# Patient Record
Sex: MALE | Race: OTHER | NOT HISPANIC OR LATINO | URBAN - METROPOLITAN AREA
[De-identification: names, ages, dates, MRNs, and addresses within clinical notes are randomized per-mention and may not be internally consistent; named-entity substitution may affect disease eponyms.]

---

## 2019-12-15 ENCOUNTER — EMERGENCY (EMERGENCY)
Facility: HOSPITAL | Age: 34
LOS: 1 days | Discharge: HOME | End: 2019-12-15
Attending: EMERGENCY MEDICINE | Admitting: STUDENT IN AN ORGANIZED HEALTH CARE EDUCATION/TRAINING PROGRAM
Payer: MEDICAID

## 2019-12-15 VITALS
RESPIRATION RATE: 18 BRPM | DIASTOLIC BLOOD PRESSURE: 90 MMHG | HEART RATE: 98 BPM | TEMPERATURE: 98 F | OXYGEN SATURATION: 99 % | SYSTOLIC BLOOD PRESSURE: 138 MMHG

## 2019-12-15 LAB
ALBUMIN SERPL ELPH-MCNC: 5.1 G/DL — HIGH (ref 3.3–5)
ALP SERPL-CCNC: 84 U/L — SIGNIFICANT CHANGE UP (ref 40–120)
ALT FLD-CCNC: 22 U/L — SIGNIFICANT CHANGE UP (ref 4–41)
AMPHET UR-MCNC: NEGATIVE — SIGNIFICANT CHANGE UP
ANION GAP SERPL CALC-SCNC: 30 MMO/L — HIGH (ref 7–14)
APAP SERPL-MCNC: < 15 UG/ML — LOW (ref 15–25)
APPEARANCE UR: CLEAR — SIGNIFICANT CHANGE UP
AST SERPL-CCNC: 31 U/L — SIGNIFICANT CHANGE UP (ref 4–40)
BACTERIA # UR AUTO: NEGATIVE — SIGNIFICANT CHANGE UP
BARBITURATES UR SCN-MCNC: NEGATIVE — SIGNIFICANT CHANGE UP
BASE EXCESS BLDV CALC-SCNC: -13.1 MMOL/L — SIGNIFICANT CHANGE UP
BASE EXCESS BLDV CALC-SCNC: -3.5 MMOL/L — SIGNIFICANT CHANGE UP
BASOPHILS # BLD AUTO: 0.07 K/UL — SIGNIFICANT CHANGE UP (ref 0–0.2)
BASOPHILS NFR BLD AUTO: 0.4 % — SIGNIFICANT CHANGE UP (ref 0–2)
BENZODIAZ UR-MCNC: POSITIVE — SIGNIFICANT CHANGE UP
BILIRUB SERPL-MCNC: 0.7 MG/DL — SIGNIFICANT CHANGE UP (ref 0.2–1.2)
BILIRUB UR-MCNC: NEGATIVE — SIGNIFICANT CHANGE UP
BLOOD GAS VENOUS - CREATININE: 0.82 MG/DL — SIGNIFICANT CHANGE UP (ref 0.5–1.3)
BLOOD GAS VENOUS - CREATININE: SIGNIFICANT CHANGE UP MG/DL (ref 0.5–1.3)
BLOOD GAS VENOUS - FIO2: 21 — SIGNIFICANT CHANGE UP
BLOOD GAS VENOUS - FIO2: 21 — SIGNIFICANT CHANGE UP
BLOOD UR QL VISUAL: NEGATIVE — SIGNIFICANT CHANGE UP
BUN SERPL-MCNC: 13 MG/DL — SIGNIFICANT CHANGE UP (ref 7–23)
CA-I BLD-SCNC: 1.19 MMOL/L — SIGNIFICANT CHANGE UP (ref 1.03–1.23)
CALCIUM SERPL-MCNC: 10.5 MG/DL — SIGNIFICANT CHANGE UP (ref 8.4–10.5)
CANNABINOIDS UR-MCNC: POSITIVE — SIGNIFICANT CHANGE UP
CHLORIDE BLDV-SCNC: 105 MMOL/L — SIGNIFICANT CHANGE UP (ref 96–108)
CHLORIDE BLDV-SCNC: 117 MMOL/L — HIGH (ref 96–108)
CHLORIDE SERPL-SCNC: 102 MMOL/L — SIGNIFICANT CHANGE UP (ref 98–107)
CK SERPL-CCNC: 410 U/L — HIGH (ref 30–200)
CO2 SERPL-SCNC: 8 MMOL/L — CRITICAL LOW (ref 22–31)
COCAINE METAB.OTHER UR-MCNC: NEGATIVE — SIGNIFICANT CHANGE UP
COLOR SPEC: SIGNIFICANT CHANGE UP
CREAT SERPL-MCNC: 1.03 MG/DL — SIGNIFICANT CHANGE UP (ref 0.5–1.3)
EOSINOPHIL # BLD AUTO: 0.02 K/UL — SIGNIFICANT CHANGE UP (ref 0–0.5)
EOSINOPHIL NFR BLD AUTO: 0.1 % — SIGNIFICANT CHANGE UP (ref 0–6)
ETHANOL BLD-MCNC: < 10 MG/DL — SIGNIFICANT CHANGE UP
GAS PNL BLDV: 136 MMOL/L — SIGNIFICANT CHANGE UP (ref 136–146)
GAS PNL BLDV: 142 MMOL/L — SIGNIFICANT CHANGE UP (ref 136–146)
GLUCOSE BLDV-MCNC: 112 MG/DL — HIGH (ref 70–99)
GLUCOSE BLDV-MCNC: 183 MG/DL — HIGH (ref 70–99)
GLUCOSE SERPL-MCNC: 175 MG/DL — HIGH (ref 70–99)
GLUCOSE UR-MCNC: NEGATIVE — SIGNIFICANT CHANGE UP
HCO3 BLDV-SCNC: 15 MMOL/L — LOW (ref 20–27)
HCO3 BLDV-SCNC: 22 MMOL/L — SIGNIFICANT CHANGE UP (ref 20–27)
HCT VFR BLD CALC: 52.7 % — HIGH (ref 39–50)
HCT VFR BLDV CALC: 43.8 % — SIGNIFICANT CHANGE UP (ref 39–51)
HCT VFR BLDV CALC: 51.2 % — HIGH (ref 39–51)
HGB BLD-MCNC: 16.7 G/DL — SIGNIFICANT CHANGE UP (ref 13–17)
HGB BLDV-MCNC: 14.3 G/DL — SIGNIFICANT CHANGE UP (ref 13–17)
HGB BLDV-MCNC: 16.7 G/DL — SIGNIFICANT CHANGE UP (ref 13–17)
HYALINE CASTS # UR AUTO: SIGNIFICANT CHANGE UP
IMM GRANULOCYTES NFR BLD AUTO: 0.4 % — SIGNIFICANT CHANGE UP (ref 0–1.5)
KETONES UR-MCNC: SIGNIFICANT CHANGE UP
LACTATE BLDV-MCNC: 16 MMOL/L — CRITICAL HIGH (ref 0.5–2)
LACTATE BLDV-MCNC: 2 MMOL/L — SIGNIFICANT CHANGE UP (ref 0.5–2)
LEUKOCYTE ESTERASE UR-ACNC: NEGATIVE — SIGNIFICANT CHANGE UP
LYMPHOCYTES # BLD AUTO: 10.8 % — LOW (ref 13–44)
LYMPHOCYTES # BLD AUTO: 2.04 K/UL — SIGNIFICANT CHANGE UP (ref 1–3.3)
MCHC RBC-ENTMCNC: 30.9 PG — SIGNIFICANT CHANGE UP (ref 27–34)
MCHC RBC-ENTMCNC: 31.7 % — LOW (ref 32–36)
MCV RBC AUTO: 97.6 FL — SIGNIFICANT CHANGE UP (ref 80–100)
METHADONE UR-MCNC: NEGATIVE — SIGNIFICANT CHANGE UP
MONOCYTES # BLD AUTO: 1.11 K/UL — HIGH (ref 0–0.9)
MONOCYTES NFR BLD AUTO: 5.9 % — SIGNIFICANT CHANGE UP (ref 2–14)
NEUTROPHILS # BLD AUTO: 15.52 K/UL — HIGH (ref 1.8–7.4)
NEUTROPHILS NFR BLD AUTO: 82.4 % — HIGH (ref 43–77)
NITRITE UR-MCNC: NEGATIVE — SIGNIFICANT CHANGE UP
NRBC # FLD: 0 K/UL — SIGNIFICANT CHANGE UP (ref 0–0)
OPIATES UR-MCNC: NEGATIVE — SIGNIFICANT CHANGE UP
OXYCODONE UR-MCNC: NEGATIVE — SIGNIFICANT CHANGE UP
PCO2 BLDV: 27 MMHG — LOW (ref 41–51)
PCO2 BLDV: 37 MMHG — LOW (ref 41–51)
PCP UR-MCNC: NEGATIVE — SIGNIFICANT CHANGE UP
PH BLDV: 7.28 PH — LOW (ref 7.32–7.43)
PH BLDV: 7.37 PH — SIGNIFICANT CHANGE UP (ref 7.32–7.43)
PH UR: 6.5 — SIGNIFICANT CHANGE UP (ref 5–8)
PLATELET # BLD AUTO: 339 K/UL — SIGNIFICANT CHANGE UP (ref 150–400)
PMV BLD: 9.7 FL — SIGNIFICANT CHANGE UP (ref 7–13)
PO2 BLDV: 173 MMHG — HIGH (ref 35–40)
PO2 BLDV: 71 MMHG — HIGH (ref 35–40)
POTASSIUM BLDV-SCNC: 4.1 MMOL/L — SIGNIFICANT CHANGE UP (ref 3.4–4.5)
POTASSIUM BLDV-SCNC: 4.4 MMOL/L — SIGNIFICANT CHANGE UP (ref 3.4–4.5)
POTASSIUM SERPL-MCNC: 4.6 MMOL/L — SIGNIFICANT CHANGE UP (ref 3.5–5.3)
POTASSIUM SERPL-SCNC: 4.6 MMOL/L — SIGNIFICANT CHANGE UP (ref 3.5–5.3)
PROLACTIN SERPL-MCNC: 30.3 NG/ML — HIGH (ref 4.1–18.4)
PROT SERPL-MCNC: 9 G/DL — HIGH (ref 6–8.3)
PROT UR-MCNC: 20 — SIGNIFICANT CHANGE UP
RBC # BLD: 5.4 M/UL — SIGNIFICANT CHANGE UP (ref 4.2–5.8)
RBC # FLD: 12.7 % — SIGNIFICANT CHANGE UP (ref 10.3–14.5)
RBC CASTS # UR COMP ASSIST: SIGNIFICANT CHANGE UP (ref 0–?)
SALICYLATES SERPL-MCNC: < 5 MG/DL — LOW (ref 15–30)
SAO2 % BLDV: 90.5 % — HIGH (ref 60–85)
SAO2 % BLDV: 99.1 % — HIGH (ref 60–85)
SODIUM SERPL-SCNC: 140 MMOL/L — SIGNIFICANT CHANGE UP (ref 135–145)
SP GR SPEC: 1.02 — SIGNIFICANT CHANGE UP (ref 1–1.04)
SQUAMOUS # UR AUTO: SIGNIFICANT CHANGE UP
UROBILINOGEN FLD QL: NORMAL — SIGNIFICANT CHANGE UP
WBC # BLD: 18.83 K/UL — HIGH (ref 3.8–10.5)
WBC # FLD AUTO: 18.83 K/UL — HIGH (ref 3.8–10.5)
WBC UR QL: SIGNIFICANT CHANGE UP (ref 0–?)

## 2019-12-15 PROCEDURE — 70450 CT HEAD/BRAIN W/O DYE: CPT | Mod: 26

## 2019-12-15 PROCEDURE — 99219: CPT

## 2019-12-15 PROCEDURE — 70553 MRI BRAIN STEM W/O & W/DYE: CPT | Mod: 26

## 2019-12-15 PROCEDURE — 71045 X-RAY EXAM CHEST 1 VIEW: CPT | Mod: 26

## 2019-12-15 RX ORDER — SODIUM CHLORIDE 9 MG/ML
2000 INJECTION INTRAMUSCULAR; INTRAVENOUS; SUBCUTANEOUS ONCE
Refills: 0 | Status: COMPLETED | OUTPATIENT
Start: 2019-12-15 | End: 2019-12-15

## 2019-12-15 RX ADMIN — SODIUM CHLORIDE 2000 MILLILITER(S): 9 INJECTION INTRAMUSCULAR; INTRAVENOUS; SUBCUTANEOUS at 11:33

## 2019-12-15 RX ADMIN — Medication 1 MILLIGRAM(S): at 21:10

## 2019-12-15 RX ADMIN — Medication 1 MILLIGRAM(S): at 11:21

## 2019-12-15 RX ADMIN — Medication 1 MILLIGRAM(S): at 11:22

## 2019-12-15 NOTE — ED CDU PROVIDER INITIAL DAY NOTE - PROGRESS NOTE DETAILS
cdu minerva su: pt examined at bedside, resting comfortably, denies any chest pain, sob, abdominal pain, awaiting mri results and spot EEG, will continue to reasses

## 2019-12-15 NOTE — ED ADULT NURSE NOTE - CHIEF COMPLAINT QUOTE
Pt states that he has a hx of "frozen" right shoulder, is supposed to have surgery and stopped taking all of his pain medications, now complaining of pain.  Of note, pt with scalp injury, states that he had a seizure on Friday and was seen and discharged from [place].  Past medical history: right shoulder pain, seizure disorder not on any meds per pt

## 2019-12-15 NOTE — ED PROVIDER NOTE - PROGRESS NOTE DETAILS
Dr. Gaspar: On evaluation father felt concern pt was having a seizure with no history of seizures but was seen shaking. Pt was awake and reported having b/l shoulder pain and breathing quickly. Pt admitted to feeling anxious and uncomfortable secondary to the pain. Dr. Gaspar: Pt states feeling better. Symptoms resolving. Discussed with Neuro who recommended spot EEG and MRI. Discussed with pt for OBS which pt agreed to.

## 2019-12-15 NOTE — ED PROVIDER NOTE - PHYSICAL EXAMINATION
neuro: Patient with right wrist contracted in flexed position with right arm contracted and stiff and straight, A&Ox3, following commands with left side but difficutl with right side, no facial droop, normal EOMs, no facial droop, response to Pain 9IV insertion) appropriate. Right side of patient body with myoclonus like moving but not gross clonic-tonic movements.

## 2019-12-15 NOTE — ED CDU PROVIDER INITIAL DAY NOTE - PHYSICAL EXAMINATION
abrasion to R side scalp and R hand   no bony tenderness, FROM of wrist and hands, sensation and strength normal  pain with shoulder shrug b/l

## 2019-12-15 NOTE — ED ADULT NURSE REASSESSMENT NOTE - NS ED NURSE REASSESS COMMENT FT1
pt awake, calm, oriented x 3. pt reports feeling much improvement. pt reports pain to right shoulder is gone. pt denies headache, chest pain, sob, n/v/d, abdominal pain. repeat VBG sent

## 2019-12-15 NOTE — ED PROVIDER NOTE - CROS ED NEURO ALL NEG
Attending Attestation (For Attendings USE Only)... Attending Attestation (For Attendings USE Only).../Scribe Attestation (For Scribes USE Only)... negative...

## 2019-12-15 NOTE — CONSULT NOTE ADULT - ASSESSMENT
Impression: R sided focal seizure (possible L sided focus from prior head trauma w/ surgery?) vs myoclonus    Recommend:  - complete infectious workup, serum tox and urine tox   - please send TSH, B12, MMA, folate   - routine EEG  - MRI brain with and without to evaluate for possible underlying structural lesion 34Y M with PMH MS s/p stem cell transplant several years ago (in [place]), currently does not follow w/ Neuro or take any medications for MS. He also has hx of b/l chronic shoulder pain due to torn rotator cuff muscles in each shoulder. He presents with 2 episodes of R sided stiffening that started on 12/13. Exam non-focal but pt lethargic due to receiving Ativan. CT Head negative.    Impression: R sided stiffening can possibly be a R sided focal seizure (possible L sided focus from prior head trauma w/ surgery that patient mentioned though CT Head negative) vs RUE myoclonus vs severe panic attack     Recommend:  - complete infectious workup, serum tox and urine tox   - routine EEG  - MRI brain with and without to evaluate for possible underlying structural lesion 34Y M with PMH MS s/p stem cell transplant several years ago (in [place]), currently does not follow w/ Neuro or take any medications for MS. He also has hx of b/l chronic shoulder pain due to torn rotator cuff muscles in each shoulder. He presents with 2 episodes of R sided stiffening that started on 12/13. Exam non-focal but pt lethargic due to receiving Ativan. CT Head negative.    Impression: R sided stiffening can possibly be a R sided focal seizure (possible L sided focus from prior head trauma w/ surgery that patient mentioned though CT Head negative) vs RUE myoclonus vs possible MS flare (although less likely given presence of positive symptoms as opposed to negative symptoms) vs severe panic attack     Recommend:  - complete infectious workup, serum tox and urine tox   - routine EEG  - MRI brain and Cspine with and without to evaluate for possible underlying structural lesion

## 2019-12-15 NOTE — ED ADULT TRIAGE NOTE - CHIEF COMPLAINT QUOTE
Pt states that he has a hx of "frozen" right shoulder, is supposed to have surgery and stopped taking all of his pain medications, now complaining of pain.  Of note, pt with scalp injury, states that he had a seizure on Friday and was seen and discharged from [place].  Past medical history: right shoulder pain, seizure disorder not on any meds per pt

## 2019-12-15 NOTE — CONSULT NOTE ADULT - SUBJECTIVE AND OBJECTIVE BOX
HPI:          MEDICATIONS  (STANDING):    MEDICATIONS  (PRN):    PAST MEDICAL & SURGICAL HISTORY:  Frozen shoulder syndrome  Multiple sclerosis  No significant past surgical history    FAMILY HISTORY:  No pertinent family history in first degree relatives    Allergies    No Known Allergies    Intolerances        SHx - No smoking, No ETOH, No drug abuse    Review of Systems:  A 10 system ROS was performed and is negative except for those mentioned in HPI      Vital Signs Last 24 Hrs  T(C): 36.4 (15 Dec 2019 10:26), Max: 36.4 (15 Dec 2019 10:26)  T(F): 97.5 (15 Dec 2019 10:26), Max: 97.5 (15 Dec 2019 10:26)  HR: 98 (15 Dec 2019 10:26) (98 - 98)  BP: 138/90 (15 Dec 2019 10:26) (138/90 - 138/90)  BP(mean): --  RR: 18 (15 Dec 2019 10:26) (18 - 18)  SpO2: 99% (15 Dec 2019 10:26) (99% - 99%)    Neurological (>12):  MS: Eyes closed but opens to verbal stimuli (just recieved Ativan), alert, oriented to person, place, situation, time. Normal affect. Follows all commands.    Language: Speech is hypophonic but clear, fluent with good repetition & comprehension     CNs: PERRLA (R = 3mm, L = 3mm). VFF. EOMI no nystagmus, no diplopia. V1-3 intact to LT/pinprick, well developed masseter muscles b/l. No facial asymmetry b/l, full eye closure strength b/l. Hearing grossly normal (rubbing fingers) b/l. Symmetric palate elevation in midline. Gag reflex deferred. Head turning & shoulder shrug intact b/l. Tongue midline, normal movements, no atrophy.      Motor: Rigid in bilateral UE, normal muscle bulk. No noticeable tremor or seizure. No pronator drift. Exam limited by bilateral shoulder pain              Deltoid	Biceps	Triceps	Wrist	Finger ABd	   R	5	5	5	5	5		5 	  L	5	5	5	5	5		5    	H-Flex	H-Ext	H-ABd	H-ADd	K-Flex	K-Ext	D-Flex	P-Flex  R	5	5	5	5	5	5	5	5 	   L	5	5	5	5	5	5	5	5	     Sensation: Intact to LT/PP/Temp/Vibration/Position b/l throughout.     Cortical: Extinction on DSS (neglect): none    Reflexes:              Biceps(C5)       BR(C6)     Triceps(C7)               Patellar(L4)    Achilles(S1)    Plantar Resp  R	2	          2	             2		        3		    2		Down   L	2	          2	             2		        2		    2		Down     Coordination: intact rapid-alt movements. No dysmetria to FTN/HTS    Gait: deferred      12-15    140  |  102  |  13  ----------------------------<  175<H>  4.6   |  8<LL>  |  1.03    Ca    10.5      15 Dec 2019 11:01    TPro  9.0<H>  /  Alb  5.1<H>  /  TBili  0.7  /  DBili  x   /  AST  31  /  ALT  22  /  AlkPhos  84  12-15    12-15    140  |  102  |  13  ----------------------------<  175<H>  4.6   |  8<LL>  |  1.03    Ca    10.5      15 Dec 2019 11:01    TPro  9.0<H>  /  Alb  5.1<H>  /  TBili  0.7  /  DBili  x   /  AST  31  /  ALT  22  /  AlkPhos  84  12-15                          16.7   18.83 )-----------( 339      ( 15 Dec 2019 11:01 )             52.7       Radiology HPI:  Patient is a 34Y M with PMH MS s/p stem cell transplant several years ago (in [place]), currently does not follow w/ Neuro or take any medications for MS. He also has hx of b/l chronic shoulder pain due to torn rotator cuff muscles in each shoulder. He presents with 2 episodes of R sided stiffening        MEDICATIONS  (STANDING):    MEDICATIONS  (PRN):    PAST MEDICAL & SURGICAL HISTORY:  Frozen shoulder syndrome  Multiple sclerosis  No significant past surgical history    FAMILY HISTORY:  No pertinent family history in first degree relatives    Allergies    No Known Allergies    Intolerances        SHx - No smoking, No ETOH, No drug abuse    Review of Systems:  A 10 system ROS was performed and is negative except for those mentioned in HPI      Vital Signs Last 24 Hrs  T(C): 36.4 (15 Dec 2019 10:26), Max: 36.4 (15 Dec 2019 10:26)  T(F): 97.5 (15 Dec 2019 10:26), Max: 97.5 (15 Dec 2019 10:26)  HR: 98 (15 Dec 2019 10:26) (98 - 98)  BP: 138/90 (15 Dec 2019 10:26) (138/90 - 138/90)  BP(mean): --  RR: 18 (15 Dec 2019 10:26) (18 - 18)  SpO2: 99% (15 Dec 2019 10:26) (99% - 99%)    Neurological (>12):  MS: Eyes closed but opens to verbal stimuli (just recieved Ativan), alert, oriented to person, place, situation, time. Normal affect. Follows all commands.    Language: Speech is hypophonic but clear, fluent with good repetition & comprehension     CNs: PERRLA (R = 3mm, L = 3mm). VFF. EOMI no nystagmus, no diplopia. V1-3 intact to LT/pinprick, well developed masseter muscles b/l. No facial asymmetry b/l, full eye closure strength b/l. Hearing grossly normal (rubbing fingers) b/l. Symmetric palate elevation in midline. Gag reflex deferred. Head turning & shoulder shrug intact b/l. Tongue midline, normal movements, no atrophy.      Motor: Rigid in bilateral UE, normal muscle bulk. No noticeable tremor or seizure. No pronator drift. Exam limited by bilateral shoulder pain              Deltoid	Biceps	Triceps	Wrist	Finger ABd	   R	5	5	5	5	5		5 	  L	5	5	5	5	5		5    	H-Flex	H-Ext	H-ABd	H-ADd	K-Flex	K-Ext	D-Flex	P-Flex  R	5	5	5	5	5	5	5	5 	   L	5	5	5	5	5	5	5	5	     Sensation: Intact to LT/PP/Temp/Vibration/Position b/l throughout.     Cortical: Extinction on DSS (neglect): none    Reflexes:              Biceps(C5)       BR(C6)     Triceps(C7)               Patellar(L4)    Achilles(S1)    Plantar Resp  R	2	          2	             2		        3		    2		Down   L	2	          2	             2		        2		    2		Down     Coordination: intact rapid-alt movements. No dysmetria to FTN/HTS    Gait: deferred      12-15    140  |  102  |  13  ----------------------------<  175<H>  4.6   |  8<LL>  |  1.03    Ca    10.5      15 Dec 2019 11:01    TPro  9.0<H>  /  Alb  5.1<H>  /  TBili  0.7  /  DBili  x   /  AST  31  /  ALT  22  /  AlkPhos  84  12-15    12-15    140  |  102  |  13  ----------------------------<  175<H>  4.6   |  8<LL>  |  1.03    Ca    10.5      15 Dec 2019 11:01    TPro  9.0<H>  /  Alb  5.1<H>  /  TBili  0.7  /  DBili  x   /  AST  31  /  ALT  22  /  AlkPhos  84  12-15                          16.7   18.83 )-----------( 339      ( 15 Dec 2019 11:01 )             52.7       Radiology HPI:  Patient is a 34Y M with PMH MS s/p stem cell transplant several years ago (in [place]), currently does not follow w/ Neuro or take any medications for MS. He also has hx of b/l chronic shoulder pain due to torn rotator cuff muscles in each shoulder. He presents with 2 episodes of R sided stiffening that started on 12/13. He felt his R side stiffening and arvin and fell down and hit his head w/ abrasion on R frontoparietal region. He then presented to [place] ER where he did not have any imaging but was treated with haldol and versed for agitation. He had another episode of R sided stiffening this morning associated with tachypnea and diaphoresis. He has never lost consciousness during these episodes and denies any urinary incontinence or tongue biting associated with these episodes. Has hx of anxiety and panic attacks but never experienced anything like this in the past. CT Head performed in the ED negative for acute intracranial pathology. Patient has received 2mg Ativan while in the ED for severe anxiety      MEDICATIONS  (STANDING):    MEDICATIONS  (PRN):    PAST MEDICAL & SURGICAL HISTORY:  Frozen shoulder syndrome  Multiple sclerosis  No significant past surgical history    FAMILY HISTORY:  No pertinent family history in first degree relatives    Allergies    No Known Allergies    Intolerances        SHx - No smoking, No ETOH, No drug abuse    Review of Systems:  A 10 system ROS was performed and is negative except for those mentioned in HPI      Vital Signs Last 24 Hrs  T(C): 36.4 (15 Dec 2019 10:26), Max: 36.4 (15 Dec 2019 10:26)  T(F): 97.5 (15 Dec 2019 10:26), Max: 97.5 (15 Dec 2019 10:26)  HR: 98 (15 Dec 2019 10:26) (98 - 98)  BP: 138/90 (15 Dec 2019 10:26) (138/90 - 138/90)  BP(mean): --  RR: 18 (15 Dec 2019 10:26) (18 - 18)  SpO2: 99% (15 Dec 2019 10:26) (99% - 99%)    Neurological (>12):  MS: Eyes closed but opens to verbal stimuli (just recieved Ativan), alert, oriented to person, place, situation, time. Normal affect. Follows all commands.    Language: Speech is hypophonic but clear, fluent with good repetition & comprehension     CNs: PERRLA (R = 3mm, L = 3mm). VFF. EOMI no nystagmus, no diplopia. V1-3 intact to LT/pinprick, well developed masseter muscles b/l. No facial asymmetry b/l, full eye closure strength b/l. Hearing grossly normal (rubbing fingers) b/l. Symmetric palate elevation in midline. Gag reflex deferred. Head turning & shoulder shrug intact b/l. Tongue midline, normal movements, no atrophy.      Motor: Rigid in bilateral UE, normal muscle bulk. No noticeable tremor or seizure. No pronator drift. Exam limited by bilateral shoulder pain              Deltoid	Biceps	Triceps	Wrist	Finger ABd	   R	5	5	5	5	5		5 	  L	5	5	5	5	5		5    	H-Flex	H-Ext	H-ABd	H-ADd	K-Flex	K-Ext	D-Flex	P-Flex  R	5	5	5	5	5	5	5	5 	   L	5	5	5	5	5	5	5	5	     Sensation: Intact to LT/PP/Temp/Vibration/Position b/l throughout.     Cortical: Extinction on DSS (neglect): none    Reflexes:              Biceps(C5)       BR(C6)     Triceps(C7)               Patellar(L4)    Achilles(S1)    Plantar Resp  R	2	          2	             2		        3		    2		Down   L	2	          2	             2		        2		    2		Down     Coordination: intact rapid-alt movements. No dysmetria to FTN/HTS    Gait: deferred      12-15    140  |  102  |  13  ----------------------------<  175<H>  4.6   |  8<LL>  |  1.03    Ca    10.5      15 Dec 2019 11:01    TPro  9.0<H>  /  Alb  5.1<H>  /  TBili  0.7  /  DBili  x   /  AST  31  /  ALT  22  /  AlkPhos  84  12-15    12-15    140  |  102  |  13  ----------------------------<  175<H>  4.6   |  8<LL>  |  1.03    Ca    10.5      15 Dec 2019 11:01    TPro  9.0<H>  /  Alb  5.1<H>  /  TBili  0.7  /  DBili  x   /  AST  31  /  ALT  22  /  AlkPhos  84  12-15                          16.7   18.83 )-----------( 339      ( 15 Dec 2019 11:01 )             52.7       Radiology HPI:  Patient is a 34Y M with PMH MS s/p stem cell transplant several years ago (in [place]), currently does not follow w/ Neuro or take any medications for MS. Has never had MS flare (further hx limited by pts mental status) He also has hx of b/l chronic shoulder pain due to torn rotator cuff muscles in each shoulder. He presents with 2 episodes of R sided stiffening that started on 12/13. He felt his R side stiffening and arvin and fell down and hit his head w/ abrasion on R frontoparietal region. He then presented to [place] ER where he did not have any imaging but was treated with haldol and versed for agitation. He had another episode of R sided stiffening this morning associated with tachypnea and diaphoresis. He has never lost consciousness during these episodes and denies any urinary incontinence or tongue biting associated with these episodes. Has hx of anxiety and panic attacks but never experienced anything like this in the past. CT Head performed in the ED negative for acute intracranial pathology. Patient has received 2mg Ativan while in the ED for severe anxiety      MEDICATIONS  (STANDING):    MEDICATIONS  (PRN):    PAST MEDICAL & SURGICAL HISTORY:  Frozen shoulder syndrome  Multiple sclerosis  No significant past surgical history    FAMILY HISTORY:  No pertinent family history in first degree relatives    Allergies    No Known Allergies    Intolerances        SHx - No smoking, No ETOH, No drug abuse    Review of Systems:  A 10 system ROS was performed and is negative except for those mentioned in HPI      Vital Signs Last 24 Hrs  T(C): 36.4 (15 Dec 2019 10:26), Max: 36.4 (15 Dec 2019 10:26)  T(F): 97.5 (15 Dec 2019 10:26), Max: 97.5 (15 Dec 2019 10:26)  HR: 98 (15 Dec 2019 10:26) (98 - 98)  BP: 138/90 (15 Dec 2019 10:26) (138/90 - 138/90)  BP(mean): --  RR: 18 (15 Dec 2019 10:26) (18 - 18)  SpO2: 99% (15 Dec 2019 10:26) (99% - 99%)    Neurological (>12):  MS: Eyes closed but opens to verbal stimuli (just recieved Ativan), alert, oriented to person, place, situation, time. Normal affect. Follows all commands.    Language: Speech is hypophonic but clear, fluent with good repetition & comprehension     CNs: PERRLA (R = 3mm, L = 3mm). VFF. EOMI no nystagmus, no diplopia. V1-3 intact to LT/pinprick, well developed masseter muscles b/l. No facial asymmetry b/l, full eye closure strength b/l. Hearing grossly normal (rubbing fingers) b/l. Symmetric palate elevation in midline. Gag reflex deferred. Head turning & shoulder shrug intact b/l. Tongue midline, normal movements, no atrophy.      Motor: Rigid in bilateral UE, normal muscle bulk. No noticeable tremor or seizure. No pronator drift. Exam limited by bilateral shoulder pain              Deltoid	Biceps	Triceps	Wrist	Finger ABd	   R	5	5	5	5	5		5 	  L	5	5	5	5	5		5    	H-Flex	H-Ext	H-ABd	H-ADd	K-Flex	K-Ext	D-Flex	P-Flex  R	5	5	5	5	5	5	5	5 	   L	5	5	5	5	5	5	5	5	     Sensation: Intact to LT/PP/Temp/Vibration/Position b/l throughout.     Cortical: Extinction on DSS (neglect): none    Reflexes:              Biceps(C5)       BR(C6)     Triceps(C7)               Patellar(L4)    Achilles(S1)    Plantar Resp  R	2	          2	             2		        3		    2		Down   L	2	          2	             2		        2		    2		Down     Coordination: intact rapid-alt movements. No dysmetria to FTN/HTS    Gait: deferred      12-15    140  |  102  |  13  ----------------------------<  175<H>  4.6   |  8<LL>  |  1.03    Ca    10.5      15 Dec 2019 11:01    TPro  9.0<H>  /  Alb  5.1<H>  /  TBili  0.7  /  DBili  x   /  AST  31  /  ALT  22  /  AlkPhos  84  12-15    12-15    140  |  102  |  13  ----------------------------<  175<H>  4.6   |  8<LL>  |  1.03    Ca    10.5      15 Dec 2019 11:01    TPro  9.0<H>  /  Alb  5.1<H>  /  TBili  0.7  /  DBili  x   /  AST  31  /  ALT  22  /  AlkPhos  84  12-15                          16.7   18.83 )-----------( 339      ( 15 Dec 2019 11:01 )             52.7       Radiology

## 2019-12-15 NOTE — ED PROVIDER NOTE - OBJECTIVE STATEMENT
Pt is a 33 y/o M w/ a PMHx of MS s/p Stem cell transplant and history of chronic b/l shoulder pain due to frozen shoulders who p/w panic like episodes. Pt states his breathing increases and then his right side tenses and contracts, the patient does not lose consciousness at any point, no post-ictal state and no urinary incontinence or bowel incontinence. Had similar episode at [place] on Friday and had no CTs and was only given versed and haldol and was groggy most of the day yesterday.

## 2019-12-15 NOTE — CONSULT NOTE ADULT - ATTENDING COMMENTS
I have personally seen, examined, and participated in the care of this patient on rounds 12/16/19 with the neurology team.  I have reviewed all pertinent clinical information, including history, physical examination, assessment and plan.   Hx as reported above.  I note in addition, in particular, or instead, as follows:    Pt reports that in 2012, when he was working n [place], he developed burning sensation in the eyes, fingertips, and feet.  Someone told him he had MS; he was evaluated by an "MS team" who took his blood and blood from donors to "shoot up [his] spine."  He reports he was told that he "should be good for up to 13 years."  Those symptoms have not recurred since.  No further details obtainable from Pt.      The "stiffening" episodes he is now reporting begin in the upper R torso, moving to the shoulder and distally in the RUE.  He demonstrates by elevating the upper R arm and in a fluid motion also the forearm so that the RUE is held extended 90 degrees at the shoulder with the elbow not fully extended, palm facing downward.  He spontaneously offers that he has panic attacks and wonders if the stiffening episodes might be panic attacks.      He reports he had gone to [place] after the first stiffening episode, then had what he says was a panic attack while in the ED, and was given sedative medication.   No further substantive information obtainable.      He works as an "analyst."      The only abnormality noted in the brain MRI report was:  "A few punctate nonspecific foci of increased T2 and FLAIR signal involve   the white matter, without associated enhancement. These are of uncertain   etiology and clinical significance. Consider serial imaging follow-up   over time for reevaluation if warranted."      On examination:    Alert, medium muscularity well-developed.  Normal comprehension, expression, prosody.  Use of language, and vocabulary, above average.  Multiple tattoos at multiple sites.       Reflex                           Right    Left   Comment    Scapulohumeral               0        0  Pectoralis                        0        0  Biceps                             2       1  Triceps                            2       2  Brachiorad                      tr?     tr?  Fing flex                          0       0  Fontana                    absent absent  Hip add                         2-      2-  Suprapatellar                 2       2  Infrapatellar                   3       3  Gastroc                         2       2  Plantar                   flexor    flexor    No tremor, abnormal involuntary movements, percussion myotonia.        DISCUSSION    The story of a Dx of MS and treatment with intraspinal injection is unusual.  For someone with an apparently above-average level of intelligence and command of English he gives unexpectedly few details about his treatment/medical evaluations in [place] and at [place].      I agree with the above assessment that a seizure disorder is a possibility.  Psychiatric diagnosis(es) also to be considered.  I agree with the recommended tests.

## 2019-12-15 NOTE — ED PROVIDER NOTE - CLINICAL SUMMARY MEDICAL DECISION MAKING FREE TEXT BOX
Pt is a 35 y/o M w/ a PMHx of MS s/p Stem cell transplant and history of chronic b/l shoulder pain due to frozen shoulders who p/w panic like episode vs Sx. lower suspicion for seizure given exam and lack of post-ictal period. Given recent fall without imaging will get CBC, CMP, VBG w/ lactate, Prolactin as labs drawn during episode, give IVFs, Ativan 2mg IVP given, CT head, consult Neuro and may need admission to r/o stroke vs further MS workup. Contraction likely due to alkalosis from severe tachypnea and panic patient placed on nonrebreather without oxygen to help with CO2 rebreathing to prevent further alkalosis.

## 2019-12-15 NOTE — ED CDU PROVIDER INITIAL DAY NOTE - ATTENDING CONTRIBUTION TO CARE
35 yo M pmh MS s/p stem cell tx, bilateral frozen shoulder 2/2 torn rotator cuffs with with episodic RUE stiffening without change in mental status of uncertain etiology. patient without symptoms at this time. plan to observe in CDU, MRI, EEG, neurology re-eval.

## 2019-12-15 NOTE — ED CDU PROVIDER INITIAL DAY NOTE - OBJECTIVE STATEMENT
34 y.o male pmhx of MS s/p stem cell transplant several years ago (in [place]), currently does not follow w/ Neuro or take any medications for MS. Also has hx of b/l chronic shoulder pain due to torn rotator cuff muscles in each shoulder.  Presented to the ED with episodes of R sided upper extremity stiffening that resolves. Pt states feels like he is panicking but not a panic attack. He is alert and oriented during the episodes. He had an episode the other night that caused him to fall down, was seen at another hospital given meds and discharged. While in the ED, pt found to have elevated wbc, lactate that cleared and had episode in the ED of RUE stiffening that lasted seconds and improved with ativan. Seen by Neuro who recommended MRI and EEG .Pt had MRI pending results, currently no complaints at this time. Denies headache, dizziness, chest pain, sob, cough, n/v/d abdominal pain, numbness, tingling, weakness.

## 2019-12-15 NOTE — ED PROVIDER NOTE - ATTENDING CONTRIBUTION TO CARE
Pt was seen and evaluated by me. Pt is a 35 y/o male with a PMHx of MS s/p Stem cell transplant and history of chronic b/l shoulder pain due to frozen shoulders, and Anxiety who presented to the ED for b/l shoulder pain. X wks. Pt states over the past few weeks having worsening b/l shoulder pain. Pt denies any falls or trauma to the area. Pt denies any fever, chills, nausea, vomiting, SOB, chest pain, or abd pain. Pt was seen 2 days ago at [place] where he was given versed and haldol and then discharged. On evaluation pt appeared anxious with tachypnea. Pt noted to have contracture of right wrist. Advised to take deep breaths which helped resolve some symptoms. Lungs CTA b/l. RRR. Abd soft, non-tender. No joint tenderness. + distal pulses. 5/5 b/l UE. No facial asymmetry.

## 2019-12-15 NOTE — ED PROVIDER NOTE - SEVERE SEPSIS ALERT DETAILS
Currently given exam and previous episodes with elevated lactate likely related to shaking and less likely related to an infectious etiology.

## 2019-12-16 VITALS
SYSTOLIC BLOOD PRESSURE: 138 MMHG | RESPIRATION RATE: 18 BRPM | OXYGEN SATURATION: 100 % | HEART RATE: 72 BPM | TEMPERATURE: 98 F | DIASTOLIC BLOOD PRESSURE: 89 MMHG

## 2019-12-16 LAB
ALBUMIN SERPL ELPH-MCNC: 4.1 G/DL — SIGNIFICANT CHANGE UP (ref 3.3–5)
ALP SERPL-CCNC: 60 U/L — SIGNIFICANT CHANGE UP (ref 40–120)
ALT FLD-CCNC: 17 U/L — SIGNIFICANT CHANGE UP (ref 4–41)
ANION GAP SERPL CALC-SCNC: 14 MMO/L — SIGNIFICANT CHANGE UP (ref 7–14)
AST SERPL-CCNC: 25 U/L — SIGNIFICANT CHANGE UP (ref 4–40)
BASE EXCESS BLDV CALC-SCNC: -1.1 MMOL/L — SIGNIFICANT CHANGE UP
BASOPHILS # BLD AUTO: 0.03 K/UL — SIGNIFICANT CHANGE UP (ref 0–0.2)
BASOPHILS NFR BLD AUTO: 0.4 % — SIGNIFICANT CHANGE UP (ref 0–2)
BILIRUB SERPL-MCNC: 0.6 MG/DL — SIGNIFICANT CHANGE UP (ref 0.2–1.2)
BLOOD GAS VENOUS - CREATININE: 0.82 MG/DL — SIGNIFICANT CHANGE UP (ref 0.5–1.3)
BLOOD GAS VENOUS - FIO2: 21 — SIGNIFICANT CHANGE UP
BUN SERPL-MCNC: 9 MG/DL — SIGNIFICANT CHANGE UP (ref 7–23)
CALCIUM SERPL-MCNC: 8.8 MG/DL — SIGNIFICANT CHANGE UP (ref 8.4–10.5)
CHLORIDE BLDV-SCNC: 105 MMOL/L — SIGNIFICANT CHANGE UP (ref 96–108)
CHLORIDE SERPL-SCNC: 105 MMOL/L — SIGNIFICANT CHANGE UP (ref 98–107)
CO2 SERPL-SCNC: 21 MMOL/L — LOW (ref 22–31)
CREAT SERPL-MCNC: 0.82 MG/DL — SIGNIFICANT CHANGE UP (ref 0.5–1.3)
EOSINOPHIL # BLD AUTO: 0.03 K/UL — SIGNIFICANT CHANGE UP (ref 0–0.5)
EOSINOPHIL NFR BLD AUTO: 0.4 % — SIGNIFICANT CHANGE UP (ref 0–6)
GAS PNL BLDV: 138 MMOL/L — SIGNIFICANT CHANGE UP (ref 136–146)
GLUCOSE BLDV-MCNC: 105 MG/DL — HIGH (ref 70–99)
GLUCOSE SERPL-MCNC: 107 MG/DL — HIGH (ref 70–99)
HBA1C BLD-MCNC: 5.5 % — SIGNIFICANT CHANGE UP (ref 4–5.6)
HCO3 BLDV-SCNC: 24 MMOL/L — SIGNIFICANT CHANGE UP (ref 20–27)
HCT VFR BLD CALC: 40.7 % — SIGNIFICANT CHANGE UP (ref 39–50)
HCT VFR BLDV CALC: 49.1 % — SIGNIFICANT CHANGE UP (ref 39–51)
HGB BLD-MCNC: 13.2 G/DL — SIGNIFICANT CHANGE UP (ref 13–17)
HGB BLDV-MCNC: 16.1 G/DL — SIGNIFICANT CHANGE UP (ref 13–17)
IMM GRANULOCYTES NFR BLD AUTO: 0.4 % — SIGNIFICANT CHANGE UP (ref 0–1.5)
LACTATE BLDV-MCNC: 1.4 MMOL/L — SIGNIFICANT CHANGE UP (ref 0.5–2)
LYMPHOCYTES # BLD AUTO: 1.31 K/UL — SIGNIFICANT CHANGE UP (ref 1–3.3)
LYMPHOCYTES # BLD AUTO: 17.7 % — SIGNIFICANT CHANGE UP (ref 13–44)
MCHC RBC-ENTMCNC: 31.3 PG — SIGNIFICANT CHANGE UP (ref 27–34)
MCHC RBC-ENTMCNC: 32.4 % — SIGNIFICANT CHANGE UP (ref 32–36)
MCV RBC AUTO: 96.4 FL — SIGNIFICANT CHANGE UP (ref 80–100)
MONOCYTES # BLD AUTO: 0.54 K/UL — SIGNIFICANT CHANGE UP (ref 0–0.9)
MONOCYTES NFR BLD AUTO: 7.3 % — SIGNIFICANT CHANGE UP (ref 2–14)
NEUTROPHILS # BLD AUTO: 5.45 K/UL — SIGNIFICANT CHANGE UP (ref 1.8–7.4)
NEUTROPHILS NFR BLD AUTO: 73.8 % — SIGNIFICANT CHANGE UP (ref 43–77)
NRBC # FLD: 0 K/UL — SIGNIFICANT CHANGE UP (ref 0–0)
PCO2 BLDV: 36 MMHG — LOW (ref 41–51)
PH BLDV: 7.42 PH — SIGNIFICANT CHANGE UP (ref 7.32–7.43)
PLATELET # BLD AUTO: 232 K/UL — SIGNIFICANT CHANGE UP (ref 150–400)
PMV BLD: 9.9 FL — SIGNIFICANT CHANGE UP (ref 7–13)
PO2 BLDV: 109 MMHG — HIGH (ref 35–40)
POTASSIUM BLDV-SCNC: 3.3 MMOL/L — LOW (ref 3.4–4.5)
POTASSIUM SERPL-MCNC: 3.5 MMOL/L — SIGNIFICANT CHANGE UP (ref 3.5–5.3)
POTASSIUM SERPL-SCNC: 3.5 MMOL/L — SIGNIFICANT CHANGE UP (ref 3.5–5.3)
PROT SERPL-MCNC: 6.8 G/DL — SIGNIFICANT CHANGE UP (ref 6–8.3)
RBC # BLD: 4.22 M/UL — SIGNIFICANT CHANGE UP (ref 4.2–5.8)
RBC # FLD: 12.7 % — SIGNIFICANT CHANGE UP (ref 10.3–14.5)
SAO2 % BLDV: 98.1 % — HIGH (ref 60–85)
SODIUM SERPL-SCNC: 140 MMOL/L — SIGNIFICANT CHANGE UP (ref 135–145)
SPECIMEN SOURCE: SIGNIFICANT CHANGE UP
WBC # BLD: 7.39 K/UL — SIGNIFICANT CHANGE UP (ref 3.8–10.5)
WBC # FLD AUTO: 7.39 K/UL — SIGNIFICANT CHANGE UP (ref 3.8–10.5)

## 2019-12-16 PROCEDURE — 99282 EMERGENCY DEPT VISIT SF MDM: CPT

## 2019-12-16 PROCEDURE — 99217: CPT

## 2019-12-16 PROCEDURE — 95819 EEG AWAKE AND ASLEEP: CPT | Mod: 26

## 2019-12-16 RX ORDER — ACETAMINOPHEN 500 MG
650 TABLET ORAL ONCE
Refills: 0 | Status: COMPLETED | OUTPATIENT
Start: 2019-12-16 | End: 2019-12-16

## 2019-12-16 RX ADMIN — Medication 650 MILLIGRAM(S): at 05:40

## 2019-12-16 RX ADMIN — Medication 650 MILLIGRAM(S): at 06:10

## 2019-12-16 NOTE — EEG REPORT - NS EEG TEXT BOX
[facility]   COMPREHENSIVE EPILEPSY CENTER   REPORT OF ROUTINE EEG W/ Video     [facility]: [address], [place] [address], Ph#: [phone]  [facility]:  [address] [address], Ph#: [phone]  [facility]: [address] [place] [address], Ph#: [phone]    Patient Name: AYO KANG  Age and : 34y ([birth year])  MRN #: [id number]  Location: [facility] 03  Referring Physician: Not Available Doctor    Study Date: 19    _____________________________________________________________  TECHNICAL INFORMATION    Placement and Labeling of Electrodes:  The EEG was performed utilizing 20 channels referential EEG connections (coronal over temporal over parasagittal montage) using all standard 10-20 electrode placements with EKG.  Recording was at a sampling rate of 256 samples per second per channel.  Time synchronized digital video recording was done simultaneously with EEG recording.  A low light infrared camera was used for low light recording.  Lee and seizure detection algorithms were utilized.    _____________________________________________________________  HISTORY    Patient is a 34y old  Male who presents with a chief complaint of     PERTINENT MEDICATION:  None.    _____________________________________________________________  STUDY INTERPRETATION    Findings: The background was continuous, spontaneously variable and reactive. During wakefulness, the posterior dominant rhythm consisted of symmetric, well-modulated 8-9 Hz activity, with amplitude to 50 uV, that attenuated to eye opening.  Low amplitude frontal beta was noted in wakefulness.    Background Slowing:  None was present.    Focal Slowing:   None were present.    Sleep Background:  Drowsiness was characterized by fragmentation, attenuation, and slowing of the background activity.    Stage II sleep transients were not recorded.    Other Non-Epileptiform Findings:  None were present.    Interictal Epileptiform Activity:   None were present.    Events:  Clinical events: None recorded.  Seizures: None recorded.    Activation Procedures:   Hyperventilation was not performed.    Photic stimulation was not performed.        Artifacts:  Intermittent myogenic and movement artifacts were noted.    ECG:  The heart rate on single channel ECG was predominantly between 60-70 BPM.    _____________________________________________________________  EEG SUMMARY/CLASSIFICATION    Abnormal EEG in the awake and drowsy  states.  - Marginal slowing of the posterior dominant rhythm.   _____________________________________________________________  EEG IMPRESSION/CLINICAL CORRELATE    Abnormal EEG study.  1. Subtle nonspecific diffuse or multifocal cerebral dysfunction.   2. No epileptiform pattern or seizure seen.  _____________________________________________________________    Nathalie Bhardwaj MD  Epilepsy Fellow [facility]   COMPREHENSIVE EPILEPSY CENTER   REPORT OF ROUTINE EEG W/ Video     [facility]: [address], [place] [address], Ph#: [phone]  [facility]:  [address] [address], Ph#: [phone]  [facility]: [address] [place] [address], Ph#: [phone]    Patient Name: AYO KANG  Age and : 34y (85)  MRN #: [id number]  Location: [facility] 03  Referring Physician: Not Available Doctor    Study Date: 19    _____________________________________________________________  TECHNICAL INFORMATION    Placement and Labeling of Electrodes:  The EEG was performed utilizing 20 channels referential EEG connections (coronal over temporal over parasagittal montage) using all standard 10-20 electrode placements with EKG.  Recording was at a sampling rate of 256 samples per second per channel.  Time synchronized digital video recording was done simultaneously with EEG recording.  A low light infrared camera was used for low light recording.  Lee and seizure detection algorithms were utilized.    _____________________________________________________________  HISTORY    Patient is a 34y old  Male who presents with a chief complaint of     PERTINENT MEDICATION:  None.    _____________________________________________________________  STUDY INTERPRETATION    Findings: The background was continuous, spontaneously variable and reactive. During wakefulness, the posterior dominant rhythm consisted of symmetric, well-modulated 8-9 Hz activity, with amplitude to 50 uV, that attenuated to eye opening.  Low amplitude frontal beta was noted in wakefulness.    Background Slowing:  None was present.    Focal Slowing:   None were present.    Sleep Background:  Drowsiness was characterized by fragmentation, attenuation, and slowing of the background activity.    Stage II sleep transients were not recorded.    Other Non-Epileptiform Findings:  None were present.    Interictal Epileptiform Activity:   None were present.    Events:  Clinical events: None recorded.  Seizures: None recorded.    Activation Procedures:   Hyperventilation was performed and did not elicit any abnormalities.    Photic stimulation was performed and did not elicit any abnormalities.        Artifacts:  Intermittent myogenic and movement artifacts were noted.    ECG:  The heart rate on single channel ECG was predominantly between 60-70 BPM.    _____________________________________________________________  EEG SUMMARY/CLASSIFICATION    Abnormal EEG in the awake and drowsy  states.  - Marginal slowing of the posterior dominant rhythm.   _____________________________________________________________  EEG IMPRESSION/CLINICAL CORRELATE    Abnormal EEG study.  1. Subtle nonspecific diffuse or multifocal cerebral dysfunction.   2. No epileptiform pattern or seizure seen.  _____________________________________________________________    Nathalie Bhardwaj MD  Epilepsy Fellow [facility]   COMPREHENSIVE EPILEPSY CENTER   REPORT OF ROUTINE EEG W/ Video     [facility]: [address], [place] [address], Ph#: [phone]  [facility]:  [address] [place] [address], Ph#: [phone]  [facility]: [address] [place] [address], Ph#: [phone]    Patient Name: AYO KANG  Age and : 34y (85)  MRN #: [id number]  Location: [facility] 03  Referring Physician: Not Available Doctor    Study Date: 19    _____________________________________________________________  TECHNICAL INFORMATION    Placement and Labeling of Electrodes:  The EEG was performed utilizing 20 channels referential EEG connections (coronal over temporal over parasagittal montage) using all standard 10-20 electrode placements with EKG.  Recording was at a sampling rate of 256 samples per second per channel.  Time synchronized digital video recording was done simultaneously with EEG recording.  A low light infrared camera was used for low light recording.  Lee and seizure detection algorithms were utilized.    _____________________________________________________________  HISTORY    Patient is a 34y old  Male who presents with a chief complaint of     PERTINENT MEDICATION:  None.    _____________________________________________________________  STUDY INTERPRETATION    Findings: The background was continuous, spontaneously variable and reactive. During wakefulness, the posterior dominant rhythm consisted of symmetric, well-modulated 8-9 Hz activity, with amplitude to 50 uV, that attenuated to eye opening.  Low amplitude frontal beta was noted in wakefulness.    Background Slowing:  None was present.    Focal Slowing:   None were present.    Sleep Background:  Drowsiness was characterized by fragmentation, attenuation, and slowing of the background activity.    Stage II sleep transients were not recorded.    Other Non-Epileptiform Findings:  None were present.    Interictal Epileptiform Activity:   None were present.    Events:  Clinical events: None recorded.  Seizures: None recorded.    Activation Procedures:   Hyperventilation was performed and did not elicit any abnormalities.    Photic stimulation was performed and did not elicit any abnormalities.        Artifacts:  Intermittent myogenic and movement artifacts were noted.    ECG:  The heart rate on single channel ECG was predominantly between 60-70 BPM.    _____________________________________________________________  EEG SUMMARY/CLASSIFICATION    Abnormal EEG in the awake and drowsy  states.  - Marginal slowing of the posterior dominant rhythm.   _____________________________________________________________  EEG IMPRESSION/CLINICAL CORRELATE    Abnormal EEG study.  1. Subtle nonspecific diffuse or multifocal cerebral dysfunction.   2. No epileptiform pattern or seizure seen.  _____________________________________________________________    Nathalie Bhardwaj MD  Epilepsy Fellow

## 2019-12-16 NOTE — ED CDU PROVIDER SUBSEQUENT DAY NOTE - PROGRESS NOTE DETAILS
minerva su: pt resting comfortably in bed  overnight, awaiting MRO read, eeg, neuro reassesment, will continue to monitor Patient in no acute distress, labs notable for downtrending WBC/Lactate, patient stating that he felt "tremendously better." MRI without focal signs of ischemia/mass, patient completed EEG awaiting results with Neuro rec's.

## 2019-12-16 NOTE — ED CDU PROVIDER SUBSEQUENT DAY NOTE - ATTENDING CONTRIBUTION TO CARE
34 y.o male pmhx of MS s/p stem cell transplant several years ago (in [place]), currently does not follow w/ Neuro or take any medications for MS. Also has hx of b/l chronic shoulder pain due to torn rotator cuff muscles in each shoulder.  Presented to the ED with episodes of R sided upper extremity stiffening that resolves. While in the ED, pt found to have elevated wbc, lactate that cleared and had episode in the ED of RUE stiffening that lasted seconds and improved with ativan. Seen by Neuro who recommended MRI and EEG .Pt had MRI pending results, currently no complaints at this time. Denies headache, dizziness, chest pain, sob, cough, n/v/d abdominal pain, numbness, tingling, weakness.    I evaluated the patient as he was resting comfortably in bed  overnight, awaiting MRO read, eeg, neuro reassessment, will continue to monitor. No acute complaints at this time.     ROGE Kennedy: I have personally performed a face to face bedside history and physical examination of this patient. I have discussed the history, examination, review of systems, assessment and plan of management with the resident. I have reviewed the electronic medical record and amended it to reflect my history, review of systems, physical exam, assessment and plan.

## 2019-12-16 NOTE — ED CDU PROVIDER SUBSEQUENT DAY NOTE - HISTORY
34 y.o male pmhx of MS s/p stem cell transplant several years ago (in [place]), currently does not follow w/ Neuro or take any medications for MS. Also has hx of b/l chronic shoulder pain due to torn rotator cuff muscles in each shoulder.  Presented to the ED with episodes of R sided upper extremity stiffening that resolves. Pt states feels like he is panicking but not a panic attack. He is alert and oriented during the episodes. He had an episode the other night that caused him to fall down, was seen at another hospital given meds and discharged. While in the ED, pt found to have elevated wbc, lactate that cleared and had episode in the ED of RUE stiffening that lasted seconds and improved with ativan. Seen by Neuro who recommended MRI and EEG .Pt had MRI pending results, currently no complaints at this time. Denies headache, dizziness, chest pain, sob, cough, n/v/d abdominal pain, numbness, tingling, weakness.

## 2019-12-16 NOTE — ED CDU PROVIDER DISPOSITION NOTE - CLINICAL COURSE
34 y.o male pmhx of MS s/p stem cell transplant several years ago (in [place]), currently does not follow w/ Neuro or take any medications for MS. Also has hx of b/l chronic shoulder pain due to torn rotator cuff muscles in each shoulder.  Presented to the ED with episodes of R sided upper extremity stiffening that resolves. Pt states feels like he is panicking but not a panic attack. He is alert and oriented during the episodes. He had an episode the other night that caused him to fall down, was seen at another hospital given meds and discharged. While in the ED, pt found to have elevated wbc, lactate that cleared and had episode in the ED of RUE stiffening that lasted seconds and improved with ativan.    During hospital course, patient had no acute events, labs (initially notable for leukocytosis and lactemia) have normalized, CTH and MRI with no acute signs of pathology, EEG without signs of epileptiform activity, currently patient requesting discharge. D/W Neurology with plan for outpt f/u. Patient to be called regarding specific Neurology attending.

## 2019-12-16 NOTE — ED CDU PROVIDER DISPOSITION NOTE - ATTENDING CONTRIBUTION TO CARE
34 y.o male pmhx of MS s/p stem cell transplant several years ago (in [place]), currently does not follow w/ Neuro or take any medications for MS. Also has hx of b/l chronic shoulder pain due to torn rotator cuff muscles in each shoulder.  Presented to the ED with episodes of R sided upper extremity stiffening that resolves. Pt states feels like he is panicking but not a panic attack. He is alert and oriented during the episodes. He had an episode the other night that caused him to fall down, was seen at another hospital given meds and discharged. While in the ED, pt found to have elevated wbc, lactate that cleared and had episode in the ED of RUE stiffening that lasted seconds and improved with ativan.    During hospital course, patient had no acute events, labs (initially notable for leukocytosis and lactemia) have normalized, CTH and MRI with no acute signs of pathology, EEG without signs of epileptiform activity, currently patient requesting discharge. D/W Neurology with plan for outpt f/u. Patient to be called regarding specific Neurology attending.     ROGE Kennedy: I have personally performed a face to face bedside history and physical examination of this patient. I have discussed the history, examination, review of systems, assessment and plan of management with the resident. I have reviewed the electronic medical record and amended it to reflect my history, review of systems, physical exam, assessment and plan.

## 2019-12-16 NOTE — ED CDU PROVIDER DISPOSITION NOTE - PATIENT PORTAL LINK FT
You can access the FollowMyHealth Patient Portal offered by [facility] by registering at the following website: [email or web address]. By joining [employer]’s FollowMyHealth portal, you will also be able to view your health information using other applications (apps) compatible with our system.

## 2019-12-17 LAB — BACTERIA UR CULT: SIGNIFICANT CHANGE UP

## 2019-12-31 PROBLEM — Z00.00 ENCOUNTER FOR PREVENTIVE HEALTH EXAMINATION: Status: ACTIVE | Noted: 2019-12-31

## 2020-01-07 ENCOUNTER — APPOINTMENT (OUTPATIENT)
Dept: NEUROLOGY | Facility: CLINIC | Age: 35
End: 2020-01-07

## 2020-02-06 NOTE — ED PROVIDER NOTE - PRINCIPAL DIAGNOSIS
[facility]  Primary Care Center   Krunal Oconnell MD  02/06/2020      Chief Complaint:   Medication Request     History of Present Illness:   Kassy Casper is a 73 year old female with a history of CAD, HTN, hyperlipidemia, and uncomplicated asthma  who presents for follow-up.     For hypertension, she is on hydrochlorothiazide 50 mg and lisinopril increased from 10 mg to 20 mg last year. Notably, her potassium is low today at 3, and she is not taking a potassium supplement.     She still uses trazodone very occasionally for sleep and does not need any refills currently.     She uses valacyclovir for cold sores, which she experiences very frequently.     States that she had a cold for 6 weeks following [place] when staying at a house with a cat, which she is allergic to. She still feels her ears are plugged.      Health care maintenance:   - Last DEXA done in 2013 showed low bone density.   - Mammogram - done today  - Colonoscopy - up to date, due 2025  - Patient describes that previously her insurance did not cover Shingrix vaccination, but   - Her TG are somewhat elevated today at 225 and she is concerned about this. She is on pravastatin 80 mg daily. Notably, LDL is 86 and HDL is 67.  - She had BRCA gene testing due to family history of breast cancer and this was negative.       Review of Systems:   Pertinent items are noted in HPI or as in patient entered ROS below, remainder of complete ROS is negative.     Active Medications:      acyclovir (ZOVIRAX) 5 % ointment, Apply topically 5 times daily To affected area at lower lip until resolved., Disp: 30 g, Rfl: 11     albuterol (PROAIR HFA, PROVENTIL HFA, VENTOLIN HFA) 108 (90 BASE) MCG/ACT inhaler, Inhale 2 puffs into the lungs every 6 hours, Disp: 1 Inhaler, Rfl: 3     ALPRAZolam (XANAX) 0.5 MG tablet, TAKE 1 TABLET BY MOUTH NIGHTLY AS NEEDED FOR ANXIETY, Disp: 30 tablet, Rfl: 0     aspirin 81 MG tablet, Take 1 tablet by mouth daily., Disp: , Rfl:       ciprofloxacin (CIPRO) 500 MG tablet, Take 1 tablet (500 mg) by mouth 2 times daily, Disp: 10 tablet, Rfl: 0     EPINEPHrine (EPIPEN) 0.3 MG/0.3ML injection, Inject 0.3 mLs (0.3 mg) into the muscle once as needed for anaphylaxis, Disp: 2 each, Rfl: 3     fluticasone (FLONASE) 50 MCG/ACT nasal spray, Spray 2 sprays into both nostrils daily, Disp: 1 Package, Rfl: 3     hydrochlorothiazide (HYDRODIURIL) 50 MG tablet, Take 1 tablet (50 mg) by mouth daily, Disp: 90 tablet, Rfl: 3     lisinopril (PRINIVIL/ZESTRIL) 10 MG tablet, TAKE 1 TABLET (10 MG) BY MOUTH DAILY, Disp: 90 tablet, Rfl: 0     Multiple Vitamin (MULTIVITAMIN  S) CAPS, Take 1 tablet by mouth daily., Disp: , Rfl:      neomycin-polymyxin-dexamethasone (MAXITROL) 3.5-36416-4.1 OINT ophthalmic ointment, Place 1 Application into the right eye 2 times daily, Disp: 1 Tube, Rfl: 1     pimecrolimus (ELIDEL) 1 % cream, Apply topically 2 times daily To redness on the face, Disp: 60 g, Rfl: 11     pravastatin (PRAVACHOL) 80 MG tablet, Take 1 tablet (80 mg) by mouth daily, Disp: 90 tablet, Rfl: 3     predniSONE (DELTASONE) 20 MG tablet, Take 1 tablet (20 mg) by mouth 2 times daily, Disp: 20 tablet, Rfl: 1     traZODone (DESYREL) 100 MG tablet, TAKE 1 TABLET (100 MG) BY MOUTH NIGHTLY AS NEEDED FOR SLEEP, Disp: , Rfl: 0     valACYclovir (VALTREX) 1000 mg tablet, Take 1 tablet (1,000 mg) by mouth daily, Disp: 90 tablet, Rfl: 3      Allergies:   Latex; Minocycline hcl; and Penicillin [penicillins]      Past Medical History:  Benign neoplasm of choroid  Coronary artery disease  Coronary atherosclerosis   Osteopenia on 2005 DEXA   Hyperlipidemia   Hypertension  Insomnia  Posterior vitreous detachment  Asthma   Rosacea  Post inflammatory hyperpigmentation  Acne  Cold sores  Lumbago      Past Surgical History:  Angioplasty - 1998  Colonoscopy - 2006    Family History:   Asthma - mother, sister  Allergies - mother, sister, brother, daughter  Glaucoma - mother  Cerebrovascular  disease - mother, sister  Alzheimer's disease - father (passed at 85 yo)  Breast cancer - sister, maternal aunt   Stroke - sister  Stent placed - sister  Eye disorder - maternal aunt  Skin cancer - maternal aunt (melanoma)  Heart disease - paternal family  Pancreatic cancer - mother (passed in 60s)  Ovarian cancer - cousins     Social History:   This patient presented alone.   Smoking status: never  Smokeless tobacco: never  Alcohol use: yes, wine   Drug use: no     Physical Exam:   /79 (BP Location: Right arm, Patient Position: Sitting, Cuff Size: Adult Regular)   Pulse 74   Temp 98  F (36.7  C) (Oral)   Wt 60.7 kg (133 lb 14.4 oz)   SpO2 98%   BMI 24.49 kg/m     Constitutional: Alert. In no distress.  Head: Normocephalic. No masses, lesions, tenderness or abnormalities.  ENT: No neck nodes or sinus tenderness.  Cardiovascular: RRR. No murmurs, clicks, gallops, or rub.  Respiratory: Clear to auscultation bilaterally, no wheezes or crackles.  Gastrointestinal: Abdomen soft. Non-tender. BS normal. No masses or organomegaly.  Musculoskeletal: Extremities normal. No gross deformities noted. Normal muscle tone.  Skin: No suspicious lesions. No rashes.  Neurologic: Gait normal. Reflexes normal and symmetric. Sensation grossly WNL.  Psychiatric: Mentation appears normal. Normal affect.   Hematologic/Lymphatic/Immunologic: Normal cervical lymph nodes.   Breasts: normal without suspicious masses, skin changes or axillary nodes.     Assessment and Plan:  Hypokalemia  Likely due to blood pressure medications.   - Potassium  - potassium chloride ER (K-TAB) 20 MEQ CR tablet  Dispense: 90 tablet; Refill: 3    Postmenopausal status  - Dexa hip/pelvis/spine*    Essential hypertension with goal blood pressure less than 140/90  - hydrochlorothiazide (HYDRODIURIL) 50 MG tablet  Dispense: 90 tablet; Refill: 3  - lisinopril (PRINIVIL/ZESTRIL) 10 MG tablet  Dispense: 90 tablet; Refill: 3    Hyperlipidemia LDL goal <70  -  pravastatin (PRAVACHOL) 80 MG tablet  Dispense: 90 tablet; Refill: 3    Routine general medical examination at a health care facility  - hydrochlorothiazide (HYDRODIURIL) 50 MG tablet  Dispense: 90 tablet; Refill: 3  - lisinopril (PRINIVIL/ZESTRIL) 10 MG tablet  Dispense: 90 tablet; Refill: 3  - pravastatin (PRAVACHOL) 80 MG tablet  Dispense: 90 tablet; Refill: 3    Urticaria, unspecified    Recurrent cold sores  Discussed that she can take this daily for preventative measures.   - valACYclovir (VALTREX) 500 MG tablet  Dispense: 90 tablet; Refill: 3     She has ear pressure since recent cold. Normal exam. Likely ETD. She can try OTC Flonase or decongestant.     Follow-up: 1 year or PRN.        Scribe Disclosure:  I, Maureen Solares, am serving as a scribe to document services personally performed by Krunal Oconnell MD at this visit, based upon the provider's statements to me. All documentation has been reviewed by the aforementioned provider prior to being entered into the official medical record.     Portions of this medical record were completed by a scribe. UPON MY REVIEW AND AUTHENTICATION BY ELECTRONIC SIGNATURE, this confirms (a) I performed the applicable clinical services, and (b) the record is accurate.   Krunal Oconnell MD MD   Adhesive capsulitis of both shoulders

## 2020-06-18 NOTE — ED ADULT NURSE NOTE - NSFALLRSKPSTHSTOCCUR_ED_ALL_ED
Discharge Summary    Patient ID:  Salo Altman  [id number]  65 year old  1954    Admit date: 6/14/2020    Discharge date:  6/18/2020      Discharge disposition: Home with home health     Admitting physician: Christ Morrison MD     Discharge physician: Keny Pena MD    Primary diagnoses:   Principal Problem:    Acute coronary syndrome (CMS/HCC)  Active Problems:    Hyperlipidemia    Hypertension    Parkinson's disease (CMS/HCC)  Resolved Problems:    * No resolved hospital problems. *      Secondary diagnoses:  Past Medical History:   Diagnosis Date   • Anxiety    • Back pain, lumbosacral    • Depression    • Dyslipidemia    • Glucose intolerance (impaired glucose tolerance)    • Hepatitis B surface antigen positive    • Hypertension    • Hyponatremia    • Parkinson's disease (CMS/HCC)    • Vitamin D deficiency          Hospital Course By Problem List (see H&P for details of admission):     The patient presents with acute coronary syndrome manifested as a non-STEMI with diffuse ST segment depression severe hypotension requiring fluids and dopamine and variable degrees of chest pain.  Troponins were negative.  Because of the significant ST segment depression emergency cardiac catheterization was performed by Dr. Morrison from Cardiology    Acute coronary syndrome             -stent placed to RCA successfully             -aspirin/Plavix/statin             -Add beta-blocker  ACEI as blood pressure allows.  Cardiology follow-up as outpatient.    Acute Diastolic Heart Failure c/w Lasix, resume metoprolol as blood pressure allows     Shock / Hypotension - Resolved off pressors.  Etiology unclear  hypovolemic vs septic vs 2/2 meds vs parkinson's related.  Orthostatic vitals positive.  Repeat echocardiogram shows normal right ventricle and LV ejection fraction 70%.  Possible diastolic dysfunction (HFpEF)  Procalcitonin normal  UA is negative.  Chest x-ray show possible infiltrate.  Received IV fluid.    Blood  cultures NTD.  Repeat lactic acid level improved   from 4-2.   Random cortisol level high normal.  Patient was started empirically azithromycin and Rocephin. Later Antibiotic DC because of low clinical suspicion for pneumonia by pulmonology.  Patient started on stress dose steroid and midodrine.  Patient will complete a taper dose of prednisone.   Pulmonology Critical Care was consulted.    Acute hypoxic respiratory failure resolved.   Volume overload with pulmonary edema possible COPD.  BiPAP p.r.n.  Continue diuretic therapy with Lasix.  Oral Steroids.  Inhaled steroids and DuoNebs.  Pulmonology consulted.     Parkinson's -   Continue Sinemet  Monitor for postural hypotension closely     Stage 3 chronic kidney disease              improved Creatinine 1.09                  Dyslipidemia -   Lipitor 40 mg daily    Discharge Medications:  Please see the AVS medication list    Consults:   IP Consult Orders (From admission, onward)     Start     Ordered    06/16/20 0810  Inpatient consult to Pulmonology  ONE TIME     Provider:  Ji Aguirre MD    06/16/20 0811                Procedures performed: Xr Chest Ap Or Pa    Result Date: 6/15/2020  EXAM: AP UPRIGHT  PORTABLE CHEST AT 1024 HOURS CLINICAL HISTORY: Fever. COMPARISON:  Earlier today at 0016 hours. FINDINGS: Interval development of ill-defined linear opacities involving the lower half of the left lung.  The lungs are otherwise clear.  There is no evidence for pneumothorax or pleural effusion. The heart size is within normal limits, and pulmonary vascularity appears slightly accentuated but likely related to the lower inspiratory volume.     IMPRESSION: There is new mild left basilar ill-defined linear opacities which may represent atelectasis or subtle infiltrate.    Xr Chest Ap Or Pa    Result Date: 6/15/2020  EXAM: XR CHEST AP OR PA INDICATION:  Chest pain, hypotension COMPARISON:  4/27/2019.     FINDINGS/IMPRESSION:  There is stable borderline prominence  of the heart size and central vasculature. The thoracic aorta is tortuous and slightly ectatic. There is no evidence of dense focal consolidation or significant effusion.     Cath/pv Case    Result Date: 6/15/2020  Profile Patient is 65-year-old male who presented to the emergency room with complaints of chest pain and lightheadedness.  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  The patient had been a smoker and is still using E cigarettes.  He has not had any prior history of coronary disease but has had hyperlipidemia on simvastatin therapy.  The patient in the emergency room had diffuse ST segment depressions in association with chest pain and received 2 L of fluid with persistence of hypotension and a systolic blood pressure of 65 requiring dopamine.  Because of this presentation even in the absence of clear-cut STEMI it was decided to take the patient urgently to the cardiac catheterization laboratory.  His initial creatinine was 1.15, troponin was negative.  Hemoglobin was 12 he had no leukocytosis or fever.  COVID test was negative. Part 1 The patient had the right femoral artery access with the use of a Doppler needle and a 6 [place]an sheath was established.  Diagnostic catheters included a 6 [place] JR4 and a 6 [place] JL4.  A 6 [place] pigtail was used for left heart catheterization and left ventriculogram.  Fluoroscopy time was 5.8 minutes.  And contrast was 150 cc.  Right femoral angiogram form prior to Angio-Seal closure device application.  He received Angiomax according to protocol for coronary intervention.  The patient O2 saturations were stable throughout the procedure but he required dopamine at 20 mcg/kilogram per minute to maintain systolic pressure of approximately 80. He received loading dose of clopidogrel 600 mg following the procedure. Part 2 hemodynamics The patient's  height was 5 ft 6 in, weight 168 lb, and body surface area 1.86 meters squared.  The left ventricular end-diastolic pressure at rest was 20 mmHg left ventricular peak systolic pressure was 80 aortic pressure was 80/60 and there was no gradient across the aortic valve Part 3 selective coronary arteriography the right coronary artery was dominant and large.  The proximal vessel was about 3.5 mm in diameter with mild irregularities.  The mid descending right coronary artery had a subtotal 99% lesion with ALY grade flow.  This was at a level of 3 in terms of diameter.  Distally the right gave rise to posterior LV branch and posterior descending artery which had minimal nonobstructive plaquing. 2. The left main was mildly calcified the proximal left main was for 0.5 mm in diameter normal.  New.  The left circumflex artery was non dominant the proximal circumflex was 3 mm in diameter.  The circumflex gave rise to a large 1st obtuse marginal branch which was normal and a medium to large 2nd obtuse marginal branch which was characterized by a proximal narrowing of about 20%. 4. The LAD arose from the left main.  The proximal LAD was about 3.5 mm in diameter the LAD had smooth contours as a terminated around the left ventricular apex.  The LAD gave rise to a large proximal diagonal branch which had minimal 10% irregularities.  There was a proximal diagonal before the septal  branches which had a 50% proximal narrowing this was a small diagonal. Part 4 right coronary artery intervention. The patient received Angiomax according to protocol.  The guide catheter was a 6 Maltese JR4 with side holes.  A whisper wire was placed beyond the stenosis with minimal difficulty.  We initially tried to use per the primary stenting technique but the lesion was resistant.  We then pre-dilated lesion with a 3 x 15 mm trek balloon at 9 atmospheres of pressure.  This expanded well.  We were then able to pass the stent across the  stenosis and dilated this to 9-10 atmospheres of pressure.  ALY grade 3 flow was present and the lesion was reduced to 0% Part 5 left ventriculogram In the 30 degree CUELLAR projection left ventricle was normal in size and showed vigorous ejection fraction of greater than 80% with no mitral regurgitation.  There was no wall motion abnormalities. Conclusions 1. Left heart catheterization showing elevated left ventricular end-diastolic pressure of 20 mCi following fluid challenge in the emergency room with dopamine at 20 mcg/kilogram per minute. 2. Selective coronary arteriography showing culprit stenosis of the dominant right coronary artery 99% stenosis with ALY grade 3 flow. 3. Successful coronary intervention and placement of a 3 x 15 mm resolute stent reducing the stenosis to 0% with ALY grade 3 flow before and after.  4. Left ventriculography showing vigorous left ventricular ejection fraction of 80% with no wall motion abnormalities and no mitral regurgitation.        Discharge Exam    Blood pressure 119/68, pulse 78, temperature 99 °F (37.2 °C), temperature source Oral, resp. rate 16, height 5' 6\" (1.676 m), weight 81.4 kg, SpO2 98 %.    General: well developed, well nourished and no apparent distress  CV: regular rate and rhythm  Resp: clear to auscultation bilaterally  Abd: soft and nontender  Ext: dorsalis pedis pulses equal bilaterally     Activity: activity as tolerated  Diet: low fat/low cholesterol     Code Status: Full Resuscitation    Pending issues to be followed up by primary care provider    Follow-up blood culture results  Orthostatic hypotension    Follow-up with:    Christ Morrison MD  [address]  [place] [id number] [phone]    In 1 month  Cardiology [address], Post Cath 4 wks. Fasting lipid, CMP and Mg level prior to appt.     Stephon Goode MD  [address] [facility] [id number] [phone]    In 1 week        Time spent on discharge was greater/less: greater than30  minutes.    Signed:    Keny Pena MD  6/18/2020  9:44 AM        For the first 24 hours after discharge any questions pertaining to the current discharge can be directed to the Hospitalist service. Please contact by pager [phone].      Physiological Fall

## 2021-03-19 ENCOUNTER — APPOINTMENT (OUTPATIENT)
Age: 36
End: 2021-03-19
Payer: MEDICAID

## 2021-03-19 PROBLEM — M75.00 ADHESIVE CAPSULITIS OF UNSPECIFIED SHOULDER: Chronic | Status: ACTIVE | Noted: 2019-12-15

## 2021-03-19 PROBLEM — G35 MULTIPLE SCLEROSIS: Chronic | Status: ACTIVE | Noted: 2019-12-15

## 2021-03-19 PROCEDURE — 0001A: CPT

## 2024-02-11 ENCOUNTER — NON-APPOINTMENT (OUTPATIENT)
Age: 39
End: 2024-02-11

## 2024-05-09 ENCOUNTER — EMERGENCY (EMERGENCY)
Facility: HOSPITAL | Age: 39
LOS: 1 days | Discharge: HOME | End: 2024-05-09
Attending: EMERGENCY MEDICINE
Payer: COMMERCIAL

## 2024-05-09 VITALS
DIASTOLIC BLOOD PRESSURE: 98 MMHG | TEMPERATURE: 98 F | OXYGEN SATURATION: 100 % | RESPIRATION RATE: 16 BRPM | HEART RATE: 86 BPM | WEIGHT: 169.98 LBS | HEIGHT: 69 IN | SYSTOLIC BLOOD PRESSURE: 152 MMHG

## 2024-05-09 PROCEDURE — 99283 EMERGENCY DEPT VISIT LOW MDM: CPT

## 2024-05-09 PROCEDURE — 99282 EMERGENCY DEPT VISIT SF MDM: CPT

## 2024-05-09 NOTE — ED PROVIDER NOTE - PATIENT PORTAL LINK FT
You can access the FollowMyHealth Patient Portal offered by [facility] by registering at the following website: [email or web address]. By joining [employer]’s FollowMyHealth portal, you will also be able to view your health information using other applications (apps) compatible with our system.

## 2024-05-09 NOTE — ED PROVIDER NOTE - OBJECTIVE STATEMENT
38-year-old male no past medical history presenting to the emergency department with small bump on his right inner bicep.  Patient noticed that a couple months ago feels that it might be getting bigger.  No pain but does feel slightly tender if he presses on it.  No redness over the area no trauma no fevers no chills.

## 2024-05-09 NOTE — ED ADULT NURSE NOTE - OBJECTIVE STATEMENT
38 year old male, A&Ox4, no PMH, presenting to ED for bump on arm. Patient states he noticed this small bump a few months ago on the medial aspect of right bicept. Patient concerned that is has gotten bigger. States there is mild stinging when he moves his arm. Denies fevers, chills, nausea. vomiting, CP, SOB. Nontender to palpation. No redness, drainage, warmth noted to area.

## 2024-05-09 NOTE — ED ADULT NURSE NOTE - EXTENSIONS OF SELF_ADULT
· Patient with documented history of possible aphasia, but she is able to talk albeit inappropriate answers  · Attempts to get more history from Son Trevor Longo) and Nursing home proved abortive None

## 2024-05-09 NOTE — ED ADULT TRIAGE NOTE - CHIEF COMPLAINT QUOTE
lump on the upper R bicep area over a month. Now reports pain and lump getting bigger. Reports cool sensation on R hand, denies numbness tingling. No trauma

## 2024-05-09 NOTE — ED PROVIDER NOTE - CARE PROVIDER_API CALL
Reji Hummel.  Dermatology  [address] [place] [id number]  Phone: [phone]  Fax: [phone]  Follow Up Time:

## 2024-05-09 NOTE — ED PROVIDER NOTE - CLINICAL SUMMARY MEDICAL DECISION MAKING FREE TEXT BOX
Chicho: 38 year old male with pmhx of srcoma here with small "bump" on right inner bicep. has been there for motnhs feels like it has just got bigger. pE:: alert, nad, nonlabored respirations, + Rue NT, 1 cm mobile firm cornelius at right medial bicep area with no overlying skin changes.  + 2 rdaial pulse intact, skin itnact, FROM of rUE, 5/5 b/l ue, alert an dorietned x 3.  plan: will recommend f/u outpatient. possible lipoma. recommend with dermatology.

## 2024-05-09 NOTE — ED PROVIDER NOTE - NSFOLLOWUPCLINICS_GEN_ALL_ED_FT
[facility] - [place]  Dermatology  [address], [address]  [place] [address]  Phone: [phone]  Fax: [phone]

## 2024-05-28 ENCOUNTER — EMERGENCY (EMERGENCY)
Facility: HOSPITAL | Age: 39
LOS: 1 days | Discharge: HOME | End: 2024-05-28
Attending: STUDENT IN AN ORGANIZED HEALTH CARE EDUCATION/TRAINING PROGRAM
Payer: COMMERCIAL

## 2024-05-28 VITALS
OXYGEN SATURATION: 100 % | RESPIRATION RATE: 18 BRPM | HEART RATE: 86 BPM | TEMPERATURE: 98 F | SYSTOLIC BLOOD PRESSURE: 140 MMHG | DIASTOLIC BLOOD PRESSURE: 93 MMHG

## 2024-05-28 VITALS
HEIGHT: 69 IN | DIASTOLIC BLOOD PRESSURE: 84 MMHG | OXYGEN SATURATION: 99 % | HEART RATE: 122 BPM | WEIGHT: 169.98 LBS | SYSTOLIC BLOOD PRESSURE: 108 MMHG | RESPIRATION RATE: 20 BRPM | TEMPERATURE: 98 F

## 2024-05-28 LAB
ALBUMIN SERPL ELPH-MCNC: 4.7 G/DL — SIGNIFICANT CHANGE UP (ref 3.3–5)
ALP SERPL-CCNC: 110 U/L — SIGNIFICANT CHANGE UP (ref 40–120)
ALT FLD-CCNC: 29 U/L — SIGNIFICANT CHANGE UP (ref 10–45)
ANION GAP SERPL CALC-SCNC: 16 MMOL/L — SIGNIFICANT CHANGE UP (ref 5–17)
APTT BLD: 29.7 SEC — SIGNIFICANT CHANGE UP (ref 24.5–35.6)
AST SERPL-CCNC: 23 U/L — SIGNIFICANT CHANGE UP (ref 10–40)
BASE EXCESS BLDV CALC-SCNC: -2 MMOL/L — SIGNIFICANT CHANGE UP (ref -2–3)
BASE EXCESS BLDV CALC-SCNC: -2.3 MMOL/L — LOW (ref -2–3)
BASOPHILS # BLD AUTO: 0 K/UL — SIGNIFICANT CHANGE UP (ref 0–0.2)
BASOPHILS NFR BLD AUTO: 0 % — SIGNIFICANT CHANGE UP (ref 0–2)
BILIRUB SERPL-MCNC: 0.5 MG/DL — SIGNIFICANT CHANGE UP (ref 0.2–1.2)
BUN SERPL-MCNC: 9 MG/DL — SIGNIFICANT CHANGE UP (ref 7–23)
CA-I SERPL-SCNC: 1.19 MMOL/L — SIGNIFICANT CHANGE UP (ref 1.15–1.33)
CA-I SERPL-SCNC: 1.22 MMOL/L — SIGNIFICANT CHANGE UP (ref 1.15–1.33)
CALCIUM SERPL-MCNC: 9.9 MG/DL — SIGNIFICANT CHANGE UP (ref 8.4–10.5)
CHLORIDE BLDV-SCNC: 105 MMOL/L — SIGNIFICANT CHANGE UP (ref 96–108)
CHLORIDE BLDV-SCNC: 107 MMOL/L — SIGNIFICANT CHANGE UP (ref 96–108)
CHLORIDE SERPL-SCNC: 104 MMOL/L — SIGNIFICANT CHANGE UP (ref 96–108)
CO2 BLDV-SCNC: 22 MMOL/L — SIGNIFICANT CHANGE UP (ref 22–26)
CO2 BLDV-SCNC: 25 MMOL/L — SIGNIFICANT CHANGE UP (ref 22–26)
CO2 SERPL-SCNC: 21 MMOL/L — LOW (ref 22–31)
CREAT SERPL-MCNC: 0.96 MG/DL — SIGNIFICANT CHANGE UP (ref 0.5–1.3)
EGFR: 103 ML/MIN/1.73M2 — SIGNIFICANT CHANGE UP
EOSINOPHIL # BLD AUTO: 0 K/UL — SIGNIFICANT CHANGE UP (ref 0–0.5)
EOSINOPHIL NFR BLD AUTO: 0 % — SIGNIFICANT CHANGE UP (ref 0–6)
GAS PNL BLDV: 136 MMOL/L — SIGNIFICANT CHANGE UP (ref 136–145)
GAS PNL BLDV: 137 MMOL/L — SIGNIFICANT CHANGE UP (ref 136–145)
GAS PNL BLDV: SIGNIFICANT CHANGE UP
GLUCOSE BLDV-MCNC: 126 MG/DL — HIGH (ref 70–99)
GLUCOSE BLDV-MCNC: 96 MG/DL — SIGNIFICANT CHANGE UP (ref 70–99)
GLUCOSE SERPL-MCNC: 125 MG/DL — HIGH (ref 70–99)
HCO3 BLDV-SCNC: 21 MMOL/L — LOW (ref 22–29)
HCO3 BLDV-SCNC: 24 MMOL/L — SIGNIFICANT CHANGE UP (ref 22–29)
HCT VFR BLD CALC: 41 % — SIGNIFICANT CHANGE UP (ref 39–50)
HCT VFR BLDA CALC: 38 % — LOW (ref 39–51)
HCT VFR BLDA CALC: 43 % — SIGNIFICANT CHANGE UP (ref 39–51)
HGB BLD CALC-MCNC: 12.5 G/DL — LOW (ref 12.6–17.4)
HGB BLD CALC-MCNC: 14.3 G/DL — SIGNIFICANT CHANGE UP (ref 12.6–17.4)
HGB BLD-MCNC: 13.8 G/DL — SIGNIFICANT CHANGE UP (ref 13–17)
INR BLD: 1.13 RATIO — SIGNIFICANT CHANGE UP (ref 0.85–1.18)
LACTATE BLDV-MCNC: 1.2 MMOL/L — SIGNIFICANT CHANGE UP (ref 0.5–2)
LACTATE BLDV-MCNC: 4 MMOL/L — CRITICAL HIGH (ref 0.5–2)
LYMPHOCYTES # BLD AUTO: 0.84 K/UL — LOW (ref 1–3.3)
LYMPHOCYTES # BLD AUTO: 9 % — LOW (ref 13–44)
MANUAL SMEAR VERIFICATION: SIGNIFICANT CHANGE UP
MCHC RBC-ENTMCNC: 29.9 PG — SIGNIFICANT CHANGE UP (ref 27–34)
MCHC RBC-ENTMCNC: 33.7 GM/DL — SIGNIFICANT CHANGE UP (ref 32–36)
MCV RBC AUTO: 88.9 FL — SIGNIFICANT CHANGE UP (ref 80–100)
MONOCYTES # BLD AUTO: 0.67 K/UL — SIGNIFICANT CHANGE UP (ref 0–0.9)
MONOCYTES NFR BLD AUTO: 7.2 % — SIGNIFICANT CHANGE UP (ref 2–14)
NEUTROPHILS # BLD AUTO: 7.63 K/UL — HIGH (ref 1.8–7.4)
NEUTROPHILS NFR BLD AUTO: 78.4 % — HIGH (ref 43–77)
NEUTS BAND # BLD: 3.6 % — SIGNIFICANT CHANGE UP (ref 0–8)
PCO2 BLDV: 32 MMHG — LOW (ref 42–55)
PCO2 BLDV: 43 MMHG — SIGNIFICANT CHANGE UP (ref 42–55)
PH BLDV: 7.35 — SIGNIFICANT CHANGE UP (ref 7.32–7.43)
PH BLDV: 7.43 — SIGNIFICANT CHANGE UP (ref 7.32–7.43)
PLAT MORPH BLD: NORMAL — SIGNIFICANT CHANGE UP
PLATELET # BLD AUTO: 333 K/UL — SIGNIFICANT CHANGE UP (ref 150–400)
PO2 BLDV: 31 MMHG — SIGNIFICANT CHANGE UP (ref 25–45)
PO2 BLDV: 45 MMHG — SIGNIFICANT CHANGE UP (ref 25–45)
POTASSIUM BLDV-SCNC: 3.4 MMOL/L — LOW (ref 3.5–5.1)
POTASSIUM BLDV-SCNC: 3.8 MMOL/L — SIGNIFICANT CHANGE UP (ref 3.5–5.1)
POTASSIUM SERPL-MCNC: 3.8 MMOL/L — SIGNIFICANT CHANGE UP (ref 3.5–5.3)
POTASSIUM SERPL-SCNC: 3.8 MMOL/L — SIGNIFICANT CHANGE UP (ref 3.5–5.3)
PROT SERPL-MCNC: 8.5 G/DL — HIGH (ref 6–8.3)
PROTHROM AB SERPL-ACNC: 11.8 SEC — SIGNIFICANT CHANGE UP (ref 9.5–13)
RBC # BLD: 4.61 M/UL — SIGNIFICANT CHANGE UP (ref 4.2–5.8)
RBC # FLD: 12.4 % — SIGNIFICANT CHANGE UP (ref 10.3–14.5)
RBC BLD AUTO: SIGNIFICANT CHANGE UP
SAO2 % BLDV: 45.7 % — LOW (ref 67–88)
SAO2 % BLDV: 78.2 % — SIGNIFICANT CHANGE UP (ref 67–88)
SODIUM SERPL-SCNC: 141 MMOL/L — SIGNIFICANT CHANGE UP (ref 135–145)
VARIANT LYMPHS # BLD: 1.8 % — SIGNIFICANT CHANGE UP (ref 0–6)
WBC # BLD: 9.31 K/UL — SIGNIFICANT CHANGE UP (ref 3.8–10.5)
WBC # FLD AUTO: 9.31 K/UL — SIGNIFICANT CHANGE UP (ref 3.8–10.5)

## 2024-05-28 PROCEDURE — 84132 ASSAY OF SERUM POTASSIUM: CPT

## 2024-05-28 PROCEDURE — 84295 ASSAY OF SERUM SODIUM: CPT

## 2024-05-28 PROCEDURE — 85018 HEMOGLOBIN: CPT

## 2024-05-28 PROCEDURE — 82330 ASSAY OF CALCIUM: CPT

## 2024-05-28 PROCEDURE — 93005 ELECTROCARDIOGRAM TRACING: CPT

## 2024-05-28 PROCEDURE — 96375 TX/PRO/DX INJ NEW DRUG ADDON: CPT

## 2024-05-28 PROCEDURE — 96374 THER/PROPH/DIAG INJ IV PUSH: CPT | Mod: XU

## 2024-05-28 PROCEDURE — 87040 BLOOD CULTURE FOR BACTERIA: CPT

## 2024-05-28 PROCEDURE — 85014 HEMATOCRIT: CPT

## 2024-05-28 PROCEDURE — 83605 ASSAY OF LACTIC ACID: CPT

## 2024-05-28 PROCEDURE — 99285 EMERGENCY DEPT VISIT HI MDM: CPT | Mod: 25

## 2024-05-28 PROCEDURE — 80053 COMPREHEN METABOLIC PANEL: CPT

## 2024-05-28 PROCEDURE — 82962 GLUCOSE BLOOD TEST: CPT

## 2024-05-28 PROCEDURE — 74177 CT ABD & PELVIS W/CONTRAST: CPT | Mod: 26,MC

## 2024-05-28 PROCEDURE — 99285 EMERGENCY DEPT VISIT HI MDM: CPT

## 2024-05-28 PROCEDURE — 74177 CT ABD & PELVIS W/CONTRAST: CPT | Mod: MC

## 2024-05-28 PROCEDURE — 82803 BLOOD GASES ANY COMBINATION: CPT

## 2024-05-28 PROCEDURE — 82435 ASSAY OF BLOOD CHLORIDE: CPT

## 2024-05-28 PROCEDURE — 85025 COMPLETE CBC W/AUTO DIFF WBC: CPT

## 2024-05-28 PROCEDURE — 82947 ASSAY GLUCOSE BLOOD QUANT: CPT

## 2024-05-28 PROCEDURE — 36415 COLL VENOUS BLD VENIPUNCTURE: CPT

## 2024-05-28 PROCEDURE — 85610 PROTHROMBIN TIME: CPT

## 2024-05-28 PROCEDURE — 85730 THROMBOPLASTIN TIME PARTIAL: CPT

## 2024-05-28 RX ORDER — ACETAMINOPHEN 500 MG
1000 TABLET ORAL ONCE
Refills: 0 | Status: COMPLETED | OUTPATIENT
Start: 2024-05-28 | End: 2024-05-28

## 2024-05-28 RX ORDER — ONDANSETRON 8 MG/1
4 TABLET, FILM COATED ORAL ONCE
Refills: 0 | Status: COMPLETED | OUTPATIENT
Start: 2024-05-28 | End: 2024-05-28

## 2024-05-28 RX ORDER — SODIUM CHLORIDE 9 MG/ML
1000 INJECTION INTRAMUSCULAR; INTRAVENOUS; SUBCUTANEOUS ONCE
Refills: 0 | Status: COMPLETED | OUTPATIENT
Start: 2024-05-28 | End: 2024-05-28

## 2024-05-28 RX ORDER — ONDANSETRON 8 MG/1
1 TABLET, FILM COATED ORAL
Qty: 1 | Refills: 0
Start: 2024-05-28

## 2024-05-28 RX ORDER — METOCLOPRAMIDE HCL 10 MG
10 TABLET ORAL ONCE
Refills: 0 | Status: COMPLETED | OUTPATIENT
Start: 2024-05-28 | End: 2024-05-28

## 2024-05-28 RX ADMIN — Medication 1 TABLET(S): at 17:53

## 2024-05-28 RX ADMIN — SODIUM CHLORIDE 1000 MILLILITER(S): 9 INJECTION INTRAMUSCULAR; INTRAVENOUS; SUBCUTANEOUS at 12:43

## 2024-05-28 RX ADMIN — Medication 400 MILLIGRAM(S): at 12:43

## 2024-05-28 RX ADMIN — ONDANSETRON 4 MILLIGRAM(S): 8 TABLET, FILM COATED ORAL at 12:43

## 2024-05-28 RX ADMIN — Medication 10 MILLIGRAM(S): at 14:54

## 2024-05-28 NOTE — ED ADULT NURSE NOTE - OBJECTIVE STATEMENT
38 y/o  male with PMH of Multiple sclerosis arrives to the ER complaining of nausea. Pt reports developing nausea vomiting since Saturday. Pt reports feeling generalized weakness and lightheaded since this morning.   Pt denies SOB, chest pain, dizziness, urinary symptoms, fevers, chills.  On assessment pt is well appearing, A&Ox4, speaking coherently, airway is patent, breathing spontaneously and unlabored. Skin is dry, warm. Abdomen is soft, no distended, no tender. Full ROM in all extremities.  Patient undressed and placed into gown, side rails up with bed locked and in lowest position for safety. call bell within reach. Emerson provided. Comfort and safety provided. will continue to reassess. 38 y/o  male with PMH of HTN, Multiple sclerosis arrives to the ER complaining of nausea. Pt reports developing abdominal pain, nausea and diarrhea since Thursday. Reports having vomiting, rectal bleeding, feeling lightheadedness, generalized weakness, chills since this morning.  Pt denies SOB, chest pain, dizziness, urinary symptoms. On assessment pt is well appearing, A&Ox4, speaking coherently, airway is patent, breathing spontaneously and unlabored. Skin is dry, warm. Abdomen is soft, no distended, no tender. Full ROM in all extremities.  Patient undressed and placed into gown, side rails up with bed locked and in lowest position for safety. call bell within reach. Elmer provided. Comfort and safety provided. will continue to reassess.

## 2024-05-28 NOTE — ED PROVIDER NOTE - NSICDXPASTMEDICALHX_GEN_ALL_CORE_FT
PAST MEDICAL HISTORY:  Frozen shoulder syndrome     Multiple sclerosis

## 2024-05-28 NOTE — ED PROVIDER NOTE - OBJECTIVE STATEMENT
39-year-old male, no significant past medical history presenting with 3 to 4 days of diarrhea.   Associated with nausea and vomiting that started today.  Of note noticed some bloody diarrhea today.  Has chills, but no known fevers.  No history of any abdominal surgeries.  Pain is mainly in the lower abdomen but radiates throughout the abdomen.  +travel to [place] - wife has similar symptoms but less severe. Denies any urinary symptoms, testicular pain.  No other complaints at this time.

## 2024-05-28 NOTE — ED PROVIDER NOTE - PROGRESS NOTE DETAILS
Patient reassessed, much improved.  Tolerating p.o.  CT showing evidence of colitis, given associated with some bloody bowel movements, will start on a short course of antibiotics.  No other actionable findings.  Will DC patient with return precautions and follow-up instructions with teach back. Beth Peñaloza, ED Attending

## 2024-05-28 NOTE — ED PROVIDER NOTE - PHYSICAL EXAMINATION
Const: Well-nourished, Well-developed, appearing stated age.  Eyes: no conjunctival injection, and symmetrical lids.  HEENT: Head NCAT, no lesions. Atraumatic external nose and ears.   Neck: Symmetric, trachea midline.   CVS: +S1/S2, DP pulses 2+ b/l.   RESP: Unlabored respiratory effort. Clear to auscultation bilaterally.  GI: +   Diffusely tender abdomen with voluntary guarding, Nondistended, No CVA tenderness b/l.   MSK: Normocephalic/Atraumatic, Lower Extremities w/o edema b/l.   Skin: Warm, dry and intact.   Neuro: Fluent Speech. Moving all extremities.   Psych: Awake, Alert, & Oriented (AAO) x3. Appropriate mood and affect.

## 2024-05-28 NOTE — ED PROVIDER NOTE - PATIENT PORTAL LINK FT
You can access the FollowMyHealth Patient Portal offered by [facility] by registering at the following website: [email or web address]. By joining [employer]’s FollowMyHealth portal, you will also be able to view your health information using other applications (apps) compatible with our system.

## 2024-05-28 NOTE — ED PROVIDER NOTE - CLINICAL SUMMARY MEDICAL DECISION MAKING FREE TEXT BOX
Beth Peñaloza, ED Attendin-year-old male presenting with chief complaint of nausea, vomiting, diarrhea.  on exam, tachycardic, diffusely tender abdomen with voluntary guarding.  No other focal findings.  Symptoms could be secondary to viral gastroenteritis, given episode of bloody diarrhea, will assess for possible colitis versus other intra-abdominal etiology on CT.  Plan for labs, symptomatic relief, CT with IV contrast, reassess to dispo.

## 2024-05-28 NOTE — ED PROVIDER NOTE - NSFOLLOWUPINSTRUCTIONS_ED_ALL_ED_FT
See attached information on colitis.      Return to the emergency room for any new or worsening symptoms.      Follow-up with your primary care doctor in 7 to 10 days.       your prescription for Zofran and antibiotics and take as directed.    Colitis    Colitis is a condition in which the colon is inflamed. It can cause diarrhea, blood in the stool, and abdominal pain. Colitis can last a short time (be acute), or it may last a long time (become chronic).    What are the causes?  This condition may be caused by:  Infections from viruses or bacteria.  A reaction to medicine.  Certain autoimmune diseases, such as Crohn's disease or ulcerative colitis.  Radiation treatment.  Decreased blood flow to the bowel (ischemia).  What are the signs or symptoms?  Symptoms of this condition include:  Diarrhea, blood in the stool, or black, tarry stool.  Pain in the joints or abdominal pain.  Fever or fatigue.  Vomiting.  Weight loss.  Bloating.  Having fewer bowel movements than usual.  A strong and sudden urge to have a bowel movement.  Feeling like the bowel is not empty after a bowel movement.  How is this diagnosed?  This condition may be diagnosed based on a stool test and a blood test. You may also have other tests, such as:  X-rays.  CT scan.  Colonoscopy.  Endoscopy.  Biopsy.  How is this treated?  Treatment for this condition depends on the cause. This condition may be treated with:  Steps to rest the bowel, such as not eating or drinking for a period of time.  Fluids that are given through an IV.  Medicine for pain and diarrhea.  Antibiotic medicines.  Cortisone medicines.  Surgery.  Follow these instructions at home:  Eating and drinking      Follow instructions from your health care provider about eating or drinking restrictions.  Drink enough fluid to keep your urine pale yellow.  Work with a dietitian to determine whether certain foods cause your condition to flare up.  Avoid foods or drinks that cause flare-ups.  Eat a well-balanced diet.  General instructions    If you were prescribed an antibiotic medicine, take it as told by your health care provider. Do not stop taking the antibiotic even if you start to feel better.  Take over-the-counter and prescription medicines only as told by your health care provider.  Keep all follow-up visits. This is important.  Contact a health care provider if:  Your symptoms do not go away.  You develop new symptoms.  Get help right away if:  You have a fever that does not go away with treatment.  You develop chills.  You have extreme weakness, fainting, or dehydration.  You vomit repeatedly.  You develop severe pain in your abdomen.  You pass bloody or tarry stool.  Summary  Colitis is a condition in which the colon is inflamed. Colitis can last a short time (be acute), or it may last a long time (become chronic).  Treatment for this condition depends on the cause and may include resting the bowel, taking medicines, or having surgery.  If you were prescribed an antibiotic medicine, take it as told by your health care provider. Do not stop taking the antibiotic even if you start to feel better.  Get help right away if you develop severe pain in your abdomen.  Keep all follow-up visits. This is important.  This information is not intended to replace advice given to you by your health care provider. Make sure you discuss any questions you have with your health care provider.

## 2024-06-02 LAB
CULTURE RESULTS: SIGNIFICANT CHANGE UP
CULTURE RESULTS: SIGNIFICANT CHANGE UP
SPECIMEN SOURCE: SIGNIFICANT CHANGE UP
SPECIMEN SOURCE: SIGNIFICANT CHANGE UP

## 2024-09-02 NOTE — ED ADULT NURSE NOTE - CAS TRG GENERAL AIRWAY, MLM
[facility]/Population Health Chart Review & Patient Outreach Details For Medication Adherence Project    Reason for Outreach Encounter: 3rd Party payor non-compliance report ([employer], [employer], [facility], etc)  2.  Patient Outreach Method: "[employer]"hart message  3.   Medication in question: metformin 500 mg    LAST FILLED: 3/1/24 for 90 day supply  Diabetes Medications               metFORMIN (GLUCOPHAGE) 500 MG tablet Take 1 tablet (500 mg total) by mouth 2 (two) times daily with meals.              4.  Reviewed and or Updates Made To: Patient Chart  5. Outreach Outcomes and/or actions taken: Sent inquiry to patient: Waiting for response.    Patent

## 2025-02-11 ENCOUNTER — EMERGENCY (EMERGENCY)
Facility: HOSPITAL | Age: 40
LOS: 1 days | Discharge: HOME | End: 2025-02-11
Attending: EMERGENCY MEDICINE
Payer: SELF-PAY

## 2025-02-11 VITALS
DIASTOLIC BLOOD PRESSURE: 94 MMHG | RESPIRATION RATE: 18 BRPM | SYSTOLIC BLOOD PRESSURE: 147 MMHG | OXYGEN SATURATION: 95 % | TEMPERATURE: 98 F

## 2025-02-11 VITALS
SYSTOLIC BLOOD PRESSURE: 132 MMHG | RESPIRATION RATE: 20 BRPM | WEIGHT: 169.98 LBS | OXYGEN SATURATION: 98 % | TEMPERATURE: 98 F | HEART RATE: 71 BPM | DIASTOLIC BLOOD PRESSURE: 73 MMHG | HEIGHT: 69 IN

## 2025-02-11 LAB
ALBUMIN SERPL ELPH-MCNC: 4.4 G/DL — SIGNIFICANT CHANGE UP (ref 3.3–5)
ALP SERPL-CCNC: 101 U/L — SIGNIFICANT CHANGE UP (ref 40–120)
ALT FLD-CCNC: 19 U/L — SIGNIFICANT CHANGE UP (ref 10–45)
ANION GAP SERPL CALC-SCNC: 12 MMOL/L — SIGNIFICANT CHANGE UP (ref 5–17)
APTT BLD: 31.4 SEC — SIGNIFICANT CHANGE UP (ref 24.5–35.6)
AST SERPL-CCNC: 18 U/L — SIGNIFICANT CHANGE UP (ref 10–40)
BASOPHILS # BLD AUTO: 0.03 K/UL — SIGNIFICANT CHANGE UP (ref 0–0.2)
BASOPHILS NFR BLD AUTO: 0.5 % — SIGNIFICANT CHANGE UP (ref 0–2)
BILIRUB SERPL-MCNC: 0.2 MG/DL — SIGNIFICANT CHANGE UP (ref 0.2–1.2)
BUN SERPL-MCNC: 9 MG/DL — SIGNIFICANT CHANGE UP (ref 7–23)
CALCIUM SERPL-MCNC: 9.7 MG/DL — SIGNIFICANT CHANGE UP (ref 8.4–10.5)
CHLORIDE SERPL-SCNC: 103 MMOL/L — SIGNIFICANT CHANGE UP (ref 96–108)
CO2 SERPL-SCNC: 24 MMOL/L — SIGNIFICANT CHANGE UP (ref 22–31)
CREAT SERPL-MCNC: 0.82 MG/DL — SIGNIFICANT CHANGE UP (ref 0.5–1.3)
EGFR: 115 ML/MIN/1.73M2 — SIGNIFICANT CHANGE UP
EOSINOPHIL # BLD AUTO: 0.06 K/UL — SIGNIFICANT CHANGE UP (ref 0–0.5)
EOSINOPHIL NFR BLD AUTO: 1.1 % — SIGNIFICANT CHANGE UP (ref 0–6)
GLUCOSE SERPL-MCNC: 102 MG/DL — HIGH (ref 70–99)
HCT VFR BLD CALC: 42.9 % — SIGNIFICANT CHANGE UP (ref 39–50)
HGB BLD-MCNC: 14 G/DL — SIGNIFICANT CHANGE UP (ref 13–17)
IMM GRANULOCYTES NFR BLD AUTO: 0.4 % — SIGNIFICANT CHANGE UP (ref 0–0.9)
INR BLD: 0.89 RATIO — SIGNIFICANT CHANGE UP (ref 0.85–1.16)
LYMPHOCYTES # BLD AUTO: 1.04 K/UL — SIGNIFICANT CHANGE UP (ref 1–3.3)
LYMPHOCYTES # BLD AUTO: 18.6 % — SIGNIFICANT CHANGE UP (ref 13–44)
MCHC RBC-ENTMCNC: 29.2 PG — SIGNIFICANT CHANGE UP (ref 27–34)
MCHC RBC-ENTMCNC: 32.6 G/DL — SIGNIFICANT CHANGE UP (ref 32–36)
MCV RBC AUTO: 89.6 FL — SIGNIFICANT CHANGE UP (ref 80–100)
MONOCYTES # BLD AUTO: 0.47 K/UL — SIGNIFICANT CHANGE UP (ref 0–0.9)
MONOCYTES NFR BLD AUTO: 8.4 % — SIGNIFICANT CHANGE UP (ref 2–14)
NEUTROPHILS # BLD AUTO: 3.96 K/UL — SIGNIFICANT CHANGE UP (ref 1.8–7.4)
NEUTROPHILS NFR BLD AUTO: 71 % — SIGNIFICANT CHANGE UP (ref 43–77)
NRBC # BLD: 0 /100 WBCS — SIGNIFICANT CHANGE UP (ref 0–0)
NRBC BLD-RTO: 0 /100 WBCS — SIGNIFICANT CHANGE UP (ref 0–0)
PLATELET # BLD AUTO: 276 K/UL — SIGNIFICANT CHANGE UP (ref 150–400)
POTASSIUM SERPL-MCNC: 4.2 MMOL/L — SIGNIFICANT CHANGE UP (ref 3.5–5.3)
POTASSIUM SERPL-SCNC: 4.2 MMOL/L — SIGNIFICANT CHANGE UP (ref 3.5–5.3)
PROT SERPL-MCNC: 7.3 G/DL — SIGNIFICANT CHANGE UP (ref 6–8.3)
PROTHROM AB SERPL-ACNC: 10.3 SEC — SIGNIFICANT CHANGE UP (ref 9.9–13.4)
RBC # BLD: 4.79 M/UL — SIGNIFICANT CHANGE UP (ref 4.2–5.8)
RBC # FLD: 13.4 % — SIGNIFICANT CHANGE UP (ref 10.3–14.5)
SODIUM SERPL-SCNC: 139 MMOL/L — SIGNIFICANT CHANGE UP (ref 135–145)
TROPONIN T, HIGH SENSITIVITY RESULT: <6 NG/L — SIGNIFICANT CHANGE UP (ref 0–51)
WBC # BLD: 5.58 K/UL — SIGNIFICANT CHANGE UP (ref 3.8–10.5)
WBC # FLD AUTO: 5.58 K/UL — SIGNIFICANT CHANGE UP (ref 3.8–10.5)

## 2025-02-11 PROCEDURE — 80053 COMPREHEN METABOLIC PANEL: CPT

## 2025-02-11 PROCEDURE — 99053 MED SERV 10PM-8AM 24 HR FAC: CPT

## 2025-02-11 PROCEDURE — 93005 ELECTROCARDIOGRAM TRACING: CPT

## 2025-02-11 PROCEDURE — 71045 X-RAY EXAM CHEST 1 VIEW: CPT | Mod: 26

## 2025-02-11 PROCEDURE — 96374 THER/PROPH/DIAG INJ IV PUSH: CPT

## 2025-02-11 PROCEDURE — 85610 PROTHROMBIN TIME: CPT

## 2025-02-11 PROCEDURE — 85730 THROMBOPLASTIN TIME PARTIAL: CPT

## 2025-02-11 PROCEDURE — 84484 ASSAY OF TROPONIN QUANT: CPT

## 2025-02-11 PROCEDURE — 99285 EMERGENCY DEPT VISIT HI MDM: CPT | Mod: 25

## 2025-02-11 PROCEDURE — 85025 COMPLETE CBC W/AUTO DIFF WBC: CPT

## 2025-02-11 PROCEDURE — 71045 X-RAY EXAM CHEST 1 VIEW: CPT

## 2025-02-11 PROCEDURE — 99285 EMERGENCY DEPT VISIT HI MDM: CPT

## 2025-02-11 RX ORDER — KETOROLAC TROMETHAMINE 10 MG
30 TABLET ORAL ONCE
Refills: 0 | Status: DISCONTINUED | OUTPATIENT
Start: 2025-02-11 | End: 2025-02-11

## 2025-02-11 RX ORDER — BACTERIOSTATIC SODIUM CHLORIDE 0.9 %
1000 VIAL (ML) INJECTION ONCE
Refills: 0 | Status: COMPLETED | OUTPATIENT
Start: 2025-02-11 | End: 2025-02-11

## 2025-02-11 RX ORDER — BACTERIOSTATIC SODIUM CHLORIDE 0.9 %
1000 VIAL (ML) INJECTION ONCE
Refills: 0 | Status: DISCONTINUED | OUTPATIENT
Start: 2025-02-11 | End: 2025-02-11

## 2025-02-11 RX ADMIN — Medication 1000 MILLILITER(S): at 08:06

## 2025-02-11 RX ADMIN — Medication 30 MILLIGRAM(S): at 08:11

## 2025-02-11 NOTE — ED ADULT NURSE NOTE - OBJECTIVE STATEMENT
39y M A&O x3 ambulatory and coming in from home. Presenting to he ER with chest tightness of sudden onset being this morning. Chest tightness woke pt up out of his sleep and is non radiating. Pt endorsing pain in chest with movement and as well a pressure feeling inside chest to "chest wall cavity". Pt as well stating pain feels worse when ribera a deep breath. Pt denies SOB. v/n/d, fevers, chills, cough, congestion, numbness, tingling. HA.

## 2025-02-11 NOTE — ED PROVIDER NOTE - PATIENT PORTAL LINK FT
You can access the FollowMyHealth Patient Portal offered by [facility] by registering at the following website: [email or web address]. By joining [employer]’s FollowMyHealth portal, you will also be able to view your health information using other applications (apps) compatible with our system.
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
## 2025-02-11 NOTE — ED PROVIDER NOTE - CLINICAL SUMMARY MEDICAL DECISION MAKING FREE TEXT BOX
39 year old male breezy   at [facility]  history of multiple sclerosis diagnosed 2010 still walking himself does not follow neurology history of pneumothorax this postsurgery of the lung presented today this chest pain still in the area which increased with movement  and touching the chest, patient looks fine with a normal vital signs, EKG is normal sinus rhythm no acute changes will obtain blood work troponin level chest x-ray, and reassess  ZR

## 2025-02-11 NOTE — ED ADULT NURSE NOTE - NSFALLUNIVINTERV_ED_ALL_ED
Bed/Stretcher in lowest position, wheels locked, appropriate side rails in place/Call bell, personal items and telephone in reach/Instruct patient to call for assistance before getting out of bed/chair/stretcher/Non-slip footwear applied when patient is off stretcher/[place] to call system/Physically safe environment - no spills, clutter or unnecessary equipment/Purposeful proactive rounding/Room/bathroom lighting operational, light cord in reach

## 2025-02-11 NOTE — ED PROVIDER NOTE - CARDIAC, MLM
Normal rate, regular rhythm.  Heart sounds S1, S2.  No murmurs, rubs or gallops. tenderness over sternum

## 2025-02-11 NOTE — ED PROVIDER NOTE - NSFOLLOWUPINSTRUCTIONS_ED_ALL_ED_FT
No signs of emergency medical condition on today's workup.  Presumptive diagnosis made as costochondritis, but further evaluation may be required by your primary care doctor or specialist for a definitive diagnosis.  Therefore, follow up as directed and if symptoms change/worsen or any emergency conditions, please return to the ER.

## 2025-05-20 ENCOUNTER — NON-APPOINTMENT (OUTPATIENT)
Age: 40
End: 2025-05-20